# Patient Record
Sex: FEMALE | Race: WHITE | NOT HISPANIC OR LATINO | Employment: FULL TIME | URBAN - METROPOLITAN AREA
[De-identification: names, ages, dates, MRNs, and addresses within clinical notes are randomized per-mention and may not be internally consistent; named-entity substitution may affect disease eponyms.]

---

## 2018-03-07 ENCOUNTER — TRANSCRIBE ORDERS (OUTPATIENT)
Dept: ADMINISTRATIVE | Facility: HOSPITAL | Age: 43
End: 2018-03-07

## 2018-03-07 DIAGNOSIS — N63.0 MULTIPLE BENIGN LUMPS OF BREAST: Primary | ICD-10-CM

## 2018-03-12 ENCOUNTER — HOSPITAL ENCOUNTER (OUTPATIENT)
Dept: RADIOLOGY | Facility: HOSPITAL | Age: 43
Discharge: HOME | End: 2018-03-12
Attending: OBSTETRICS & GYNECOLOGY
Payer: COMMERCIAL

## 2018-03-12 ENCOUNTER — HOSPITAL ENCOUNTER (OUTPATIENT)
Dept: RADIOLOGY | Facility: HOSPITAL | Age: 43
Discharge: HOME | End: 2018-03-12
Payer: COMMERCIAL

## 2018-03-12 DIAGNOSIS — N63.0 MULTIPLE BENIGN LUMPS OF BREAST: ICD-10-CM

## 2018-03-12 PROCEDURE — 77066 DX MAMMO INCL CAD BI: CPT

## 2018-03-12 PROCEDURE — 76642 ULTRASOUND BREAST LIMITED: CPT

## 2018-03-19 ENCOUNTER — OFFICE VISIT (OUTPATIENT)
Dept: SURGERY | Facility: CLINIC | Age: 43
End: 2018-03-19
Payer: COMMERCIAL

## 2018-03-19 VITALS
BODY MASS INDEX: 26.65 KG/M2 | HEIGHT: 63 IN | HEART RATE: 80 BPM | WEIGHT: 150.4 LBS | SYSTOLIC BLOOD PRESSURE: 110 MMHG | DIASTOLIC BLOOD PRESSURE: 60 MMHG | TEMPERATURE: 98.5 F

## 2018-03-19 DIAGNOSIS — N63.10 BREAST MASS, RIGHT: Primary | ICD-10-CM

## 2018-03-19 PROCEDURE — 99204 OFFICE O/P NEW MOD 45 MIN: CPT | Performed by: SPECIALIST

## 2018-03-19 RX ORDER — MONTELUKAST SODIUM 10 MG/1
10 TABLET ORAL
COMMUNITY
End: 2021-05-07

## 2018-03-19 NOTE — PROGRESS NOTES
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   She had a mammogram and ultrasound done  This shows at the 9 o'clock position 8 cm from the nipple is a 0 5 x 0 9 x 1 0 hypoechoic oval shaped nodule which most likely represents a fibroadenoma  This is BI-RADS category three probably benign  She is not sure of any family history of breast cancer  She wishes to have this removed  It is painful to touch  Review of Systems   Respiratory: On inhalers for asthma which has gotten better since she stop smoking    Breast, right breast mass  History of masses of the right and left breast which were fibroadenoma    Historical Information   Past Medical History:   Diagnosis Date    Asthma      Past Surgical History:   Procedure Laterality Date    BREAST SURGERY       SECTION      RECTAL SURGERY       Social History   History   Alcohol Use    Yes     Comment: socially     History   Drug use: Unknown    Types: Marijuana     History   Smoking Status    Former Smoker   Smokeless Tobacco    Never Used     Family History:   Family History   Problem Relation Age of Onset    Adopted: Yes       Meds/Allergies   all current active meds have been reviewed, current meds: No current facility-administered medications for this visit       and PTA meds:    (Not in a hospital admission)  Allergies   Allergen Reactions    Penicillins Shortness Of Breath    Other      DOG    Pollen Extract        Objective       Current Vitals:   Blood Pressure: 110/60 (18 1304)  Pulse: 80 (18 1304)  Temperature: 98 5 °F (36 9 °C) (18 1304)  Temp Source: Oral (18 1304)  Height: 5' 3" (160 cm) (18 1304)  Weight - Scale: 68 2 kg (150 lb 6 4 oz) (03/19/18 2944)    Invasive Devices          No matching active lines, drains, or airways          Physical Exam   Constitutional: She is oriented to person, place, and time  She appears well-developed and well-nourished  No distress  HENT:   Head: Normocephalic and atraumatic  Right Ear: External ear normal    Left Ear: External ear normal    Nose: Nose normal    Mouth/Throat: Oropharynx is clear and moist  No oropharyngeal exudate  Eyes: Conjunctivae and EOM are normal  Pupils are equal, round, and reactive to light  Right eye exhibits no discharge  Left eye exhibits no discharge  No scleral icterus  Neck: Normal range of motion  Neck supple  No JVD present  No tracheal deviation present  No thyromegaly present  Cardiovascular: Normal rate, regular rhythm and intact distal pulses  Exam reveals no gallop and no friction rub  No murmur heard  Pulmonary/Chest: Effort normal and breath sounds normal  No stridor  No respiratory distress  She has no wheezes  She has no rales  She exhibits no tenderness  Left breast and axilla normal  Right axilla normal  Right breast at approximately nine o'clock 8 cm from the areola  Small mass present  Mobile   Abdominal: Soft  Bowel sounds are normal  She exhibits no distension and no mass  There is no tenderness  There is no rebound and no guarding  Musculoskeletal: Normal range of motion  She exhibits no edema, tenderness or deformity  Lymphadenopathy:     She has no cervical adenopathy  Neurological: She is alert and oriented to person, place, and time  She has normal reflexes  No cranial nerve deficit  She exhibits normal muscle tone  Coordination normal    Skin: Skin is warm  No rash noted  She is not diaphoretic  No erythema  No pallor  Psychiatric: She has a normal mood and affect  Her behavior is normal  Judgment and thought content normal        Lab Results: I have personally reviewed pertinent lab results    , CBC: No results found for: WBC, HGB, HCT, MCV, PLT, ADJUSTEDWBC, MCH, MCHC, RDW, MPV, NRBC, CMP: No results found for: NA, CL, CO2, ANIONGAP, BUN, CREATININE, GLUCOSE, CALCIUM, AST, ALT, ALKPHOS, PROT, ALBUMIN, BILITOT, EGFR, Coagulation: No results found for: PT, INR, APTT, Urinalysis: Lab Results   Component Value Date    COLORU taras 07/02/2016   , Amylase: No results found for: AMYLASE, Lipase: No results found for: LIPASE  Imaging: Mammo Diagnostic Bilateral W Cad    Result Date: 3/12/2018  Narrative: Patient History: Excisional biopsy of both breasts  Patient's BMI is 27 5  Reason for exam: clinical finding  Mammo Diagnostic Bilateral W CAD: March 12, 2018 - Check In #: [de-identified] Bilateral CC and MLO view(s) were taken  Technologist: JEN Sanchez The breast tissue is heterogeneously dense, potentially limiting the sensitivity of mammography  Patient risk, included in this report, assists in determining the appropriate screening regimen (such as 3-D mammography or the inclusion of automated breast ultrasound or MRI)  3-D mammography may also remain indicated as screening  There are scattered benign-appearing calcifications bilaterally  Left breast is unremarkable  There is dense tissue in the upper outer right breast with an ultrasound correlate  US Breast Right Limited: March 12, 2018 - Check In #: [de-identified] Standard views  Technologist: ERROL iLao RDMS RVT RDCS Heterogeneity can be either focal or diffuse  The breast echotexture is characterized by multiple small areas of increased and decreased echogenicity  Shadowing may occur at the interfaces of the fat lobules and parenchyma  This pattern occurs in younger breasts and those with heterogeneously dense parenchyma depicted mammographically  Real-time images are obtained in the right breast at the 9 o'clock position, 8 cm from the nipple in the region of a palpable finding    A well-circumscribed oval-shaped hypoechoic nodule measures 1 0 x 0 5 x 0 9 cm this most likely represents a fibroadenoma  IMPRESSION:Well-circumscribed hypoechoic nodule at the 9 o'clock position of the right breast corresponding to a palpable finding likely represents a fibroadenoma  ACR BI-RADS® Assessments: BiRad:3 - Probably Benign (Overall) Diag Mammogram: BiRad:3 - probably benign finding in the right breast  Right breast Right Brst US: BiRad:3 - probably benign finding in the left breast  Recommendation: Ultrasound of the right breast in 6 months  Analyzed by CAD The patient is scheduled in a reminder system for screening mammography  Transcription Location: [facility] 98: [id number] Risk Value(s): Tyrer-Cuzick 10 Year: 1 400%, Tyrer-Cuzick Lifetime: 8 700%, Myriad Table: 1 5%, EMELINA 5 Year: 1 0%, NCI Lifetime: 10 4%    Us Breast Right Limited (diagnostic)    Result Date: 3/12/2018  Narrative: Patient History: Excisional biopsy of both breasts  Patient's BMI is 27 5  Reason for exam: clinical finding  Mammo Diagnostic Bilateral W CAD: March 12, 2018 - Check In #: [de-identified] Bilateral CC and MLO view(s) were taken  Technologist: JEN Maurer The breast tissue is heterogeneously dense, potentially limiting the sensitivity of mammography  Patient risk, included in this report, assists in determining the appropriate screening regimen (such as 3-D mammography or the inclusion of automated breast ultrasound or MRI)  3-D mammography may also remain indicated as screening  There are scattered benign-appearing calcifications bilaterally  Left breast is unremarkable  There is dense tissue in the upper outer right breast with an ultrasound correlate  US Breast Right Limited: March 12, 2018 - Check In #: [de-identified] Standard views  Technologist: ERROL Jimenez RDMS RVT RDCS Heterogeneity can be either focal or diffuse  The breast echotexture is characterized by multiple small areas of increased and decreased echogenicity   Shadowing may occur at the interfaces of the fat lobules and parenchyma  This pattern occurs in younger breasts and those with heterogeneously dense parenchyma depicted mammographically  Real-time images are obtained in the right breast at the 9 o'clock position, 8 cm from the nipple in the region of a palpable finding  A well-circumscribed oval-shaped hypoechoic nodule measures 1 0 x 0 5 x 0 9 cm this most likely represents a fibroadenoma  IMPRESSION:Well-circumscribed hypoechoic nodule at the 9 o'clock position of the right breast corresponding to a palpable finding likely represents a fibroadenoma  ACR BI-RADS® Assessments: BiRad:3 - Probably Benign (Overall) Diag Mammogram: BiRad:3 - probably benign finding in the right breast  Right breast Right Brst US: BiRad:3 - probably benign finding in the left breast  Recommendation: Ultrasound of the right breast in 6 months  Analyzed by CAD The patient is scheduled in a reminder system for screening mammography  Transcription Location: [facility] 98: [id number] Risk Value(s): Tyrer-Cuzick 10 Year: 1 400%, Tyrer-Cuzick Lifetime: 8 700%, Myriad Table: 1 5%, EMELINA 5 Year: 1 0%, NCI Lifetime: 10 4%    EKG, Pathology, and Other Studies: I have personally reviewed pertinent reports  Assessment/Plan     Assessment:  José Miguel has a right breast mass  I believe I am feeling it but would like to check her on a different part of her cycle to make sure that this is the area that needs to be excised  Plan:  Re examined in two weeks at different part of cycle        Counseling / Coordination of Care  Total face to face office time spent today 45 minutes  Greater than 50% of total time was spent with the patient and / or family counseling and / or coordination of care  A description of the counseling / coordination of care- see assessement

## 2018-03-19 NOTE — PROGRESS NOTES
Assessment/Plan:      Diagnoses and all orders for this visit:    Breast mass, right    Other orders  -     montelukast (SINGULAIR) 10 mg tablet; Take 10 mg by mouth daily at bedtime          Subjective:     Patient ID: Kathleen Landaverde is a 36 y o  female      HPI    Review of Systems      Objective:     Physical Exam

## 2018-04-02 ENCOUNTER — OFFICE VISIT (OUTPATIENT)
Dept: SURGERY | Facility: CLINIC | Age: 43
End: 2018-04-02
Payer: COMMERCIAL

## 2018-04-02 VITALS
HEART RATE: 57 BPM | DIASTOLIC BLOOD PRESSURE: 78 MMHG | TEMPERATURE: 98.2 F | WEIGHT: 150.6 LBS | BODY MASS INDEX: 26.68 KG/M2 | SYSTOLIC BLOOD PRESSURE: 118 MMHG | HEIGHT: 63 IN

## 2018-04-02 DIAGNOSIS — N63.10 BREAST MASS, RIGHT: Primary | ICD-10-CM

## 2018-04-02 PROCEDURE — 99213 OFFICE O/P EST LOW 20 MIN: CPT | Performed by: SPECIALIST

## 2018-04-02 NOTE — PROGRESS NOTES
Assessment/Plan:  Andrew Corral returns today for evaluation of a mass of the right breast found on the ultrasound  She is still not sure of the area today as she is just now finishing her menstrual cycle  We will check again in two weeks at her mid cycle  It we can feel this mesh she would like to have this removed  She will call sooner if she feels it before that time  Diagnoses and all orders for this visit:    Breast mass, right          Subjective:     Patient ID: Gerardo Whitley is a 36 y o  female  Bruno Garner comes back today to check a right breast mass  She had felt a mass and an ultrasound showed to be a fibroadenoma  She did not have a biopsy  When I checked her two weeks ago she had trouble feeling the area  She is now having her menstrual cycle which is finished and her breasts are somewhat full  She is having trouble feeling it today  She does have an area of tenderness  This measured 1 x 0 5 x 0 9 cm most likely representing a fibroadenoma  She had a mammogram and an ultrasound  Review of Systems  history of three fibroadenomas removed in the past since 1995    Objective:     Physical Exam   Pulmonary/Chest:   Nodule present the 9 o'clock position 8 cm from the areola    Some tenderness

## 2018-12-18 ENCOUNTER — TRANSCRIBE ORDERS (OUTPATIENT)
Dept: ADMINISTRATIVE | Facility: HOSPITAL | Age: 43
End: 2018-12-18

## 2018-12-18 DIAGNOSIS — N63.10 BREAST MASS, RIGHT: Primary | ICD-10-CM

## 2019-01-04 ENCOUNTER — HOSPITAL ENCOUNTER (OUTPATIENT)
Dept: RADIOLOGY | Facility: HOSPITAL | Age: 44
Discharge: HOME | End: 2019-01-04
Attending: OBSTETRICS & GYNECOLOGY
Payer: COMMERCIAL

## 2019-01-04 VITALS — WEIGHT: 143 LBS | HEIGHT: 63 IN | BODY MASS INDEX: 25.34 KG/M2

## 2019-01-04 DIAGNOSIS — R92.8 ABNORMAL MAMMOGRAM: ICD-10-CM

## 2019-01-04 PROCEDURE — 76642 ULTRASOUND BREAST LIMITED: CPT

## 2019-01-04 PROCEDURE — G0279 TOMOSYNTHESIS, MAMMO: HCPCS

## 2019-01-04 PROCEDURE — 77066 DX MAMMO INCL CAD BI: CPT

## 2019-06-11 ENCOUNTER — TRANSCRIBE ORDERS (OUTPATIENT)
Dept: ADMINISTRATIVE | Facility: HOSPITAL | Age: 44
End: 2019-06-11

## 2019-06-11 DIAGNOSIS — N63.10 BREAST MASS, RIGHT: Primary | ICD-10-CM

## 2019-06-12 ENCOUNTER — TRANSCRIBE ORDERS (OUTPATIENT)
Dept: ADMINISTRATIVE | Facility: HOSPITAL | Age: 44
End: 2019-06-12

## 2019-06-12 DIAGNOSIS — D24.1 FIBROADENOMA OF RIGHT BREAST: Primary | ICD-10-CM

## 2019-06-26 ENCOUNTER — HOSPITAL ENCOUNTER (OUTPATIENT)
Dept: RADIOLOGY | Facility: HOSPITAL | Age: 44
Discharge: HOME | End: 2019-06-26
Attending: OBSTETRICS & GYNECOLOGY
Payer: COMMERCIAL

## 2019-06-26 ENCOUNTER — HOSPITAL ENCOUNTER (OUTPATIENT)
Dept: RADIOLOGY | Facility: HOSPITAL | Age: 44
Discharge: HOME | End: 2019-06-26
Payer: COMMERCIAL

## 2019-06-26 VITALS — HEIGHT: 63 IN | BODY MASS INDEX: 25.33 KG/M2

## 2019-06-26 DIAGNOSIS — D24.1 FIBROADENOMA OF RIGHT BREAST: ICD-10-CM

## 2019-06-26 DIAGNOSIS — N63.10 BREAST MASS, RIGHT: ICD-10-CM

## 2019-06-26 PROCEDURE — 77065 DX MAMMO INCL CAD UNI: CPT

## 2019-06-26 PROCEDURE — G0279 TOMOSYNTHESIS, MAMMO: HCPCS

## 2020-01-22 ENCOUNTER — OFFICE VISIT (OUTPATIENT)
Dept: URGENT CARE | Facility: CLINIC | Age: 45
End: 2020-01-22
Payer: COMMERCIAL

## 2020-01-22 VITALS
SYSTOLIC BLOOD PRESSURE: 139 MMHG | HEIGHT: 63 IN | RESPIRATION RATE: 18 BRPM | TEMPERATURE: 98.9 F | DIASTOLIC BLOOD PRESSURE: 82 MMHG | OXYGEN SATURATION: 100 % | BODY MASS INDEX: 25.69 KG/M2 | HEART RATE: 62 BPM | WEIGHT: 145 LBS

## 2020-01-22 DIAGNOSIS — M77.8 TENDINITIS OF RIGHT FOREARM: ICD-10-CM

## 2020-01-22 DIAGNOSIS — M77.11 RIGHT LATERAL EPICONDYLITIS: Primary | ICD-10-CM

## 2020-01-22 PROCEDURE — 99213 OFFICE O/P EST LOW 20 MIN: CPT | Performed by: PHYSICIAN ASSISTANT

## 2020-01-22 RX ORDER — MELOXICAM 15 MG/1
15 TABLET ORAL DAILY
Qty: 15 TABLET | Refills: 0 | Status: SHIPPED | OUTPATIENT
Start: 2020-01-22 | End: 2021-05-05 | Stop reason: ALTCHOICE

## 2020-01-23 NOTE — PROGRESS NOTES
[facility]        NAME: Keena Muñiz is a 38 y o  female  : 1975    MRN: [id number]  DATE: 2020  TIME: 5:50 PM    Assessment and Plan   Right lateral epicondylitis [M77 11]  1  Right lateral epicondylitis  Ambulatory referral to Orthopedic Surgery    Ambulatory referral to Physical Therapy    meloxicam (MOBIC) 15 mg tablet   2  Tendinitis of right forearm  Ambulatory referral to Orthopedic Surgery    Ambulatory referral to Physical Therapy    meloxicam (MOBIC) 15 mg tablet         Patient Instructions     Discussed condition with pt  She has tennis elbow on the right and resultant tendinitis of the forearm due to compensation  I gave her Rx for Meloxicam and rec ice, gentle stretching, avoiding overuse as much as possible  I also gave her order to start PT and for ortho consult if condition persists/worsens despite conservative measures  Follow up with PCP in 3-5 days  Proceed to  ER if symptoms worsen  Chief Complaint     Chief Complaint   Patient presents with    Arm Pain     pain in rt arm from above the elbow to wrist  Has job with repititive hand movements  has been bothersome for 4 months         History of Present Illness       Pt presents with 3-4 month history of persistent pain in her right lateral elbow into the forearm and down to the wrist  Denies known injury/trauma  She reports she performs a lot of daily repetitive tasks and is right hand dominant  Denies any RUE numbness or paresthesias  Pain does not radiate proximally into the upper arm or shoulder  Has tried managing conservatively including wearing a tennis elbow sleeve without relief  Review of Systems   Review of Systems   Constitutional: Negative  Respiratory: Negative  Cardiovascular: Negative  Gastrointestinal: Negative  Genitourinary: Negative  Musculoskeletal:        Right elbow and forearm pain   Neurological: Negative            Current Medications       Current Outpatient Medications:     albuterol (PROVENTIL HFA,VENTOLIN HFA) 90 mcg/act inhaler, Inhale 2 puffs every 6 (six) hours as needed for wheezing , Disp: , Rfl:     fluticasone-salmeterol (ADVAIR) 250-50 mcg/dose inhaler, Inhale 1 puff daily  , Disp: , Rfl:     montelukast (SINGULAIR) 10 mg tablet, Take 10 mg by mouth daily at bedtime, Disp: , Rfl:     meloxicam (MOBIC) 15 mg tablet, Take 1 tablet (15 mg total) by mouth daily, Disp: 15 tablet, Rfl: 0    Current Allergies     Allergies as of 2020 - Reviewed 2020   Allergen Reaction Noted    Penicillins Shortness Of Breath 2016    Other  2017    Pollen extract  2017            The following portions of the patient's history were reviewed and updated as appropriate: allergies, current medications, past family history, past medical history, past social history, past surgical history and problem list      Past Medical History:   Diagnosis Date    Asthma        Past Surgical History:   Procedure Laterality Date    BREAST BIOPSY Bilateral     benign    BREAST CYST EXCISION Right 2019    benign    BREAST SURGERY       SECTION      RECTAL SURGERY         Family History   Adopted: Yes         Medications have been verified  Objective   /82   Pulse 62   Temp 98 9 °F (37 2 °C)   Resp 18   Ht 5' 3" (1 6 m)   Wt 65 8 kg (145 lb)   LMP 2019   SpO2 100%   BMI 25 69 kg/m²        Physical Exam     Physical Exam   Constitutional: She is oriented to person, place, and time  She appears well-developed and well-nourished  No distress  Musculoskeletal:   TTP over the right lateral epicondyle worsened with active wrist extension against resistance  Also tender diffusely over the dorsal right forearm but no visible STS or other deformity noted  Wrist exam is unremarkable  Neurological: She is alert and oriented to person, place, and time  Psychiatric: She has a normal mood and affect     Vitals reviewed

## 2020-01-23 NOTE — PATIENT INSTRUCTIONS
Tennis Elbow   WHAT YOU NEED TO KNOW:   Tennis elbow is inflammation of the tendons in your elbow  Tendons are strong tissues that connect muscle to bone  DISCHARGE INSTRUCTIONS:   Return to the emergency department if:   · You suddenly have no feeling in your arm, hand, or fingers  · You suddenly cannot move your arm, wrist, hand, or fingers  Contact your healthcare provider if:   · You have a fever  · You have more pain or weakness in your arm, wrist, hand, or fingers  · You have new numbness or tingling in your arm, hand, or fingers  · You have questions or concerns about your condition or care  Medicines:   · Acetaminophen  decreases pain and fever  It is available without a doctor's order  Ask how much to take and how often to take it  Follow directions  Read the labels of all other medicines you are using to see if they also contain acetaminophen, or ask your doctor or pharmacist  Acetaminophen can cause liver damage if not taken correctly  Do not use more than 4 grams (4,000 milligrams) total of acetaminophen in one day  · NSAIDs , such as ibuprofen, help decrease swelling, pain, and fever  This medicine is available with or without a doctor's order  NSAIDs can cause stomach bleeding or kidney problems in certain people  If you take blood thinner medicine, always ask your healthcare provider if NSAIDs are safe for you  Always read the medicine label and follow directions  · Take your medicine as directed  Contact your healthcare provider if you think your medicine is not helping or if you have side effects  Tell him or her if you are allergic to any medicine  Keep a list of the medicines, vitamins, and herbs you take  Include the amounts, and when and why you take them  Bring the list or the pill bottles to follow-up visits  Carry your medicine list with you in case of an emergency    Physical therapy:  A physical therapist teaches you exercises to help improve movement and strength, and to decrease pain  Self-care:   · Wear your support device as directed  Devices, such as an arm strap, brace, or splint, help limit your arm movement  They also decrease pain and help prevent more damage to your tendon  Ask your healthcare provider how to care for your arm while you wear a brace or splint  · Rest your injured arm  and avoid activities that cause pain  This will help your tendons heal     · Apply ice on your elbow  for 15 to 20 minutes every hour or as directed  Use an ice pack, or put crushed ice in a plastic bag  Cover it with a towel before you apply it to your skin  Ice helps prevent tissue damage and decreases swelling and pain  · Elevate your elbow  above the level of your heart as often as you can  This will help decrease swelling and pain  Prop your elbow on pillows or blankets to keep it elevated comfortably  Follow up with your healthcare provider as directed:  Write down your questions so you remember to ask them during your visits  © 2017 [address] [facility] Information is for End User's use only and may not be sold, redistributed or otherwise used for commercial purposes  All illustrations and images included in CareNotes® are the copyrighted property of A D [employer] M [employer]  or [employer]  The above information is an  only  It is not intended as medical advice for individual conditions or treatments  Talk to your doctor, nurse or pharmacist before following any medical regimen to see if it is safe and effective for you  Tendinitis   WHAT YOU NEED TO KNOW:   Tendinitis is painful inflammation or breakdown of your tendons  It may also be called tendinopathy  Tendinitis often occurs in the knee, shoulder, ankle, hip, or elbow  DISCHARGE INSTRUCTIONS:   Medicines:   · Pain medicines  such as acetaminophen or NSAIDs may decrease swelling and pain or fever  These medicines are available without a doctor's order  Ask which medicine to take   Ask how much to take and when to take it  Follow directions  Acetaminophen and NSAIDs can cause liver or kidney damage if not taken correctly  If you take blood thinner medicine, always ask your healthcare provider if NSAIDs are safe for you  Always read the medicine label and follow the directions on it before using these medicine  · Take your medicine as directed  Contact your healthcare provider if you think your medicine is not helping or if you have side effects  Tell him if you are allergic to any medicine  Keep a list of the medicines, vitamins, and herbs you take  Include the amounts, and when and why you take them  Bring the list or the pill bottles to follow-up visits  Carry your medicine list with you in case of an emergency  Management:   · Rest  your tendon as directed to help it heal  Ask your healthcare provider if you need to stop putting weight on your affected area  · Ice  helps decrease swelling and pain  Ice may also help prevent tissue damage  Use an ice pack, or put crushed ice in a plastic bag  Cover it with a towel and place it on the affected area for 10 to 15 minutes every hour or as directed  · Support devices  such as a cane, splint, shoe insert, or brace may help reduce your pain  · Physical therapy  may be ordered by your healthcare provider  This may be used to teach you exercises to help improve movement and strength, and to decrease pain  You may also learn how to improve your posture, and how to lift or exercise correctly  Prevention:   · Stretch and warm up  before you exercise  · Exercise regularly  to strengthen the muscles around your joint  Ease into an exercise routine for the first 3 weeks to prevent another injury  Ask your healthcare provider about the best exercise plan for you  Rest fully between activities  · Use the right equipment  for sports and exercise  Wear braces or tape around weak joints as directed    Follow up with your healthcare provider as directed:  Write down your questions so you remember to ask them during your visits  Contact your healthcare provider if:   · You have increased pain even after you take medicine  · You have questions or concerns about your condition or care  Return to the emergency department if:   · You have increased redness over the joint, or swelling in the joint  · You suddenly cannot move your joint  © 2017 [address] Information is for End User's use only and may not be sold, redistributed or otherwise used for commercial purposes  All illustrations and images included in CareNotes® are the copyrighted property of [employer]  or [employer]  The above information is an  only  It is not intended as medical advice for individual conditions or treatments  Talk to your doctor, nurse or pharmacist before following any medical regimen to see if it is safe and effective for you

## 2021-01-28 ENCOUNTER — OFFICE VISIT (OUTPATIENT)
Dept: FAMILY MEDICINE CLINIC | Facility: CLINIC | Age: 46
End: 2021-01-28
Payer: COMMERCIAL

## 2021-01-28 VITALS
WEIGHT: 155 LBS | HEART RATE: 76 BPM | BODY MASS INDEX: 27.46 KG/M2 | HEIGHT: 63 IN | TEMPERATURE: 96.4 F | RESPIRATION RATE: 16 BRPM | DIASTOLIC BLOOD PRESSURE: 70 MMHG | SYSTOLIC BLOOD PRESSURE: 104 MMHG

## 2021-01-28 DIAGNOSIS — Z13.6 SCREENING FOR CARDIOVASCULAR, RESPIRATORY, AND GENITOURINARY DISEASES: ICD-10-CM

## 2021-01-28 DIAGNOSIS — J45.40 MODERATE PERSISTENT ASTHMA WITHOUT COMPLICATION: ICD-10-CM

## 2021-01-28 DIAGNOSIS — R53.83 OTHER FATIGUE: ICD-10-CM

## 2021-01-28 DIAGNOSIS — Z13.83 SCREENING FOR CARDIOVASCULAR, RESPIRATORY, AND GENITOURINARY DISEASES: ICD-10-CM

## 2021-01-28 DIAGNOSIS — M25.512 PAIN OF BOTH SHOULDER JOINTS: ICD-10-CM

## 2021-01-28 DIAGNOSIS — Z13.1 SCREENING FOR DIABETES MELLITUS (DM): ICD-10-CM

## 2021-01-28 DIAGNOSIS — M25.511 PAIN OF BOTH SHOULDER JOINTS: ICD-10-CM

## 2021-01-28 DIAGNOSIS — M79.10 MYALGIA: ICD-10-CM

## 2021-01-28 DIAGNOSIS — J30.9 ALLERGIC RHINITIS, UNSPECIFIED SEASONALITY, UNSPECIFIED TRIGGER: Primary | ICD-10-CM

## 2021-01-28 DIAGNOSIS — Z13.89 SCREENING FOR CARDIOVASCULAR, RESPIRATORY, AND GENITOURINARY DISEASES: ICD-10-CM

## 2021-01-28 DIAGNOSIS — Z00.00 HEALTHCARE MAINTENANCE: ICD-10-CM

## 2021-01-28 PROCEDURE — 99204 OFFICE O/P NEW MOD 45 MIN: CPT | Performed by: FAMILY MEDICINE

## 2021-01-28 PROCEDURE — 3725F SCREEN DEPRESSION PERFORMED: CPT | Performed by: FAMILY MEDICINE

## 2021-01-28 PROCEDURE — 1036F TOBACCO NON-USER: CPT | Performed by: FAMILY MEDICINE

## 2021-01-28 PROCEDURE — 3008F BODY MASS INDEX DOCD: CPT | Performed by: FAMILY MEDICINE

## 2021-01-28 RX ORDER — MONTELUKAST SODIUM 10 MG/1
10 TABLET ORAL DAILY
Qty: 30 TABLET | Refills: 5 | Status: SHIPPED | OUTPATIENT
Start: 2021-01-28 | End: 2021-08-02

## 2021-01-28 RX ORDER — BIOTIN 10000 MCG
CAPSULE ORAL
COMMUNITY

## 2021-01-28 RX ORDER — FLUTICASONE PROPIONATE 250 UG/1
1 POWDER, METERED RESPIRATORY (INHALATION) 2 TIMES DAILY
Qty: 1 EACH | Refills: 5 | Status: SHIPPED | OUTPATIENT
Start: 2021-01-28 | End: 2021-05-03

## 2021-01-28 RX ORDER — MONTELUKAST SODIUM 10 MG/1
10 TABLET ORAL DAILY
COMMUNITY
Start: 2021-01-02 | End: 2021-01-28 | Stop reason: SDUPTHER

## 2021-01-28 RX ORDER — FLUTICASONE PROPIONATE 250 UG/1
1 POWDER, METERED RESPIRATORY (INHALATION) 2 TIMES DAILY
COMMUNITY
Start: 2021-01-23 | End: 2021-01-28 | Stop reason: SDUPTHER

## 2021-01-28 RX ORDER — DIPHENOXYLATE HYDROCHLORIDE AND ATROPINE SULFATE 2.5; .025 MG/1; MG/1
1 TABLET ORAL DAILY
COMMUNITY

## 2021-01-28 RX ORDER — ALBUTEROL SULFATE 90 UG/1
2 AEROSOL, METERED RESPIRATORY (INHALATION) EVERY 6 HOURS PRN
Qty: 1 INHALER | Refills: 5 | Status: SHIPPED | OUTPATIENT
Start: 2021-01-28 | End: 2021-05-03 | Stop reason: SDUPTHER

## 2021-01-28 RX ORDER — ALBUTEROL SULFATE 90 UG/1
AEROSOL, METERED RESPIRATORY (INHALATION)
COMMUNITY
Start: 2021-01-05 | End: 2021-01-28 | Stop reason: SDUPTHER

## 2021-01-28 NOTE — PROGRESS NOTES
Assessment/Plan:    No problem-specific Assessment & Plan notes found for this encounter  Asthma, persistent type  Unchanged  Suggest she not use MARTINE unless needed in order to gauge response to prevention  Pulmonary referral given at pt request    AR  Stable  Lives with triggers in home  h1b use discussed  Refill singulair    Fatigue/myalgias/malaise  Start with labs r/o arthritides and autoimmune    Does have psych hx but denies issues at this time     Diagnoses and all orders for this visit:    Allergic rhinitis, unspecified seasonality, unspecified trigger    Pain of both shoulder joints    Moderate persistent asthma without complication  -     Flovent Diskus 250 MCG/BLIST AEPB; Inhale 1 puff 2 (two) times a day Rinse/spit after use  -     albuterol (PROVENTIL HFA,VENTOLIN HFA) 90 mcg/act inhaler; Inhale 2 puffs every 6 (six) hours as needed for shortness of breath Rinse/spit after use  -     montelukast (SINGULAIR) 10 mg tablet; Take 1 tablet (10 mg total) by mouth daily  -     Ambulatory referral to Pulmonology; Future    Screening for cardiovascular, respiratory, and genitourinary diseases  -     Lipid Panel with Direct LDL reflex; Future    Screening for diabetes mellitus (DM)  -     Comprehensive metabolic panel; Future    Healthcare maintenance  -     Ambulatory referral to Obstetrics / Gynecology; Future  -     Ambulatory referral to Obstetrics / Gynecology    Other fatigue  -     TSH, 3rd generation; Future  -     Lyme Total Antibody Profile with reflex to WB; Future  -     CBC and differential; Future    Myalgia  -     ALEM Screen w/ Reflex to Titer/Pattern; Future  -     C-reactive protein; Future  -     Sedimentation rate, automated; Future  -     RF Screen w/ Reflex to Titer; Future  -     Vitamin D 25 hydroxy;  Future    Other orders  -     Discontinue: albuterol (PROVENTIL HFA,VENTOLIN HFA) 90 mcg/act inhaler; INHALE 2 PUFFS BY MOUTH EVERY 6 HOURS AS NEEED FOR WHEEZING  -     Discontinue: Flovent Diskus 250 MCG/BLIST AEPB; Inhale 1 puff 2 (two) times a day  -     Discontinue: montelukast (SINGULAIR) 10 mg tablet; Take 10 mg by mouth daily  -     multivitamin (THERAGRAN) TABS; Take 1 tablet by mouth daily  -     Biotin 10 MG CAPS; Take by mouth  -     Probiotic Product (PROBIOTIC-10 PO); Take by mouth              Return in about 2 weeks (around 2/11/2021) for Annual physical     Subjective:      Patient ID: Edgar Ordonez is a 54 y o  female  Chief Complaint   Patient presents with   [facility]     new patient  ac/cma     joint pain     per pt whole body  has had this for years  HPI  Prior doctor Dr Brent Sy    Has asthma, since age 24s  On flovent disk 250 bid  singulair helps    Albuterol mdi also  Prn  Weather trigger  Also illness trigger  Uses albuterol 1-2x/d regularly as told, uses prn about 6-7x/d when bad    had allergist bar with skin tests in past  h1b in past prn  Allergic to own rabbit and own dog    Has years of body pain per pt  Sensitive to touch  Arms/fingers/legs and now joints  Physical job  Daily  Getting worse  No prior testing  Adopted  Feels fingers swell at times  No joint clicking  Wt fluctuates  No unexplained wt loss  No prior lyme test or tick bite  Used to spend time outdoors    No psoriasis    Naproxen has helped in past    Last gyn few years ago  Dr Aram Arita  Due for mammo q6m    Hx of psychiatrist  Pills in past  Didn't like so stopped  Denies depression today or anxiety    The following portions of the patient's history were reviewed and updated as appropriate: allergies, current medications, past family history, past medical history, past social history, past surgical history and problem list     Review of Systems   Constitutional: Positive for fatigue  Negative for chills and fever  HENT: Negative for trouble swallowing  Eyes: Negative for discharge and redness  Respiratory: Negative for shortness of breath      Cardiovascular: Negative for chest pain    Musculoskeletal: Positive for arthralgias, joint swelling and myalgias  Negative for neck stiffness  Skin: Negative for color change and rash  Psychiatric/Behavioral: Negative for dysphoric mood  The patient is not nervous/anxious  Current Outpatient Medications   Medication Sig Dispense Refill    albuterol (PROVENTIL HFA,VENTOLIN HFA) 90 mcg/act inhaler Inhale 2 puffs every 6 (six) hours as needed for shortness of breath Rinse/spit after use 1 Inhaler 5    Biotin 10 MG CAPS Take by mouth      Flovent Diskus 250 MCG/BLIST AEPB Inhale 1 puff 2 (two) times a day Rinse/spit after use 1 each 5    montelukast (SINGULAIR) 10 mg tablet Take 1 tablet (10 mg total) by mouth daily 30 tablet 5    multivitamin (THERAGRAN) TABS Take 1 tablet by mouth daily      Probiotic Product (PROBIOTIC-10 PO) Take by mouth       No current facility-administered medications for this visit  Objective:    /70   Pulse 76   Temp (!) 96 4 °F (35 8 °C)   Resp 16   Ht 5' 3" (1 6 m)   Wt 70 3 kg (155 lb)   BMI 27 46 kg/m²        Physical Exam  Vitals signs and nursing note reviewed  Constitutional:       General: She is not in acute distress  Appearance: Normal appearance  She is well-developed  She is not ill-appearing  HENT:      Head: Normocephalic  Right Ear: Tympanic membrane, ear canal and external ear normal  There is no impacted cerumen  Left Ear: Tympanic membrane, ear canal and external ear normal  There is no impacted cerumen  Mouth/Throat:      Mouth: Mucous membranes are moist       Pharynx: No oropharyngeal exudate or posterior oropharyngeal erythema  Eyes:      General: No scleral icterus  Conjunctiva/sclera: Conjunctivae normal    Neck:      Musculoskeletal: Neck supple  No neck rigidity  Thyroid: No thyromegaly  Cardiovascular:      Rate and Rhythm: Normal rate and regular rhythm  Pulses: Normal pulses  Heart sounds: No murmur     Pulmonary: Effort: Pulmonary effort is normal  No respiratory distress  Breath sounds: No wheezing or rales  Abdominal:      General: There is no distension  Palpations: Abdomen is soft  Tenderness: There is no abdominal tenderness  There is no rebound  Musculoskeletal:         General: No swelling or deformity  Right lower leg: No edema  Left lower leg: No edema  Comments: No redness or synovitis   Lymphadenopathy:      Cervical: No cervical adenopathy  Skin:     General: Skin is warm and dry  Coloration: Skin is not jaundiced or pale  Findings: No rash  Comments: No psoriatic plaques   Neurological:      Mental Status: She is alert  Motor: No weakness  Gait: Gait normal    Psychiatric:         Mood and Affect: Mood normal          Behavior: Behavior normal          Thought Content: Thought content normal        BMI Counseling: Body mass index is 27 46 kg/m²  The BMI is above normal  Nutrition recommendations include decreasing portion sizes and moderation in carbohydrate intake  Exercise recommendations include exercising 3-5 times per week  No pharmacotherapy was ordered                  Wilhemena Koyanagi, DO

## 2021-04-15 ENCOUNTER — OFFICE VISIT (OUTPATIENT)
Dept: FAMILY MEDICINE CLINIC | Facility: CLINIC | Age: 46
End: 2021-04-15
Payer: COMMERCIAL

## 2021-04-15 VITALS
OXYGEN SATURATION: 98 % | HEART RATE: 75 BPM | WEIGHT: 162.4 LBS | HEIGHT: 63 IN | RESPIRATION RATE: 18 BRPM | BODY MASS INDEX: 28.77 KG/M2 | DIASTOLIC BLOOD PRESSURE: 86 MMHG | TEMPERATURE: 97.9 F | SYSTOLIC BLOOD PRESSURE: 122 MMHG

## 2021-04-15 DIAGNOSIS — F41.9 ANXIETY: Primary | ICD-10-CM

## 2021-04-15 DIAGNOSIS — J30.9 ALLERGIC RHINITIS, UNSPECIFIED SEASONALITY, UNSPECIFIED TRIGGER: ICD-10-CM

## 2021-04-15 PROCEDURE — 1036F TOBACCO NON-USER: CPT | Performed by: NURSE PRACTITIONER

## 2021-04-15 PROCEDURE — 3725F SCREEN DEPRESSION PERFORMED: CPT | Performed by: NURSE PRACTITIONER

## 2021-04-15 PROCEDURE — 99213 OFFICE O/P EST LOW 20 MIN: CPT | Performed by: NURSE PRACTITIONER

## 2021-04-15 NOTE — PATIENT INSTRUCTIONS
Anxiety   AMBULATORY CARE:   Anxiety  is a condition that causes you to feel extremely worried or nervous  The feelings are so strong that they can cause problems with your daily activities or sleep  Anxiety may be triggered by something you fear, or it may happen without a cause  Family or work stress, smoking, caffeine, and alcohol can increase your risk for anxiety  Certain medicines or health conditions can also increase your risk  Anxiety can become a long-term condition if it is not managed or treated  Common signs and symptoms that may occur with anxiety:   · Fatigue or muscle tightness    · Shaking, restlessness, or irritability    · Problems focusing    · Trouble sleeping    · Feeling jumpy, easily startled, or dizzy    · Rapid heartbeat or shortness of breath    Call your local emergency number (911 in the [address]) if:   · You have chest pain, tightness, or heaviness that may spread to your shoulders, arms, jaw, neck, or back  · You feel like hurting yourself or someone else  Call your doctor if:   · Your symptoms get worse or do not get better with treatment  · Your anxiety keeps you from doing your regular daily activities  · You have new symptoms since your last visit  · You have questions or concerns about your condition or care  Treatment for anxiety  may include medicines to help you feel calm and relaxed, and decrease your symptoms  Medicines are usually given together with therapy or other treatments  Manage anxiety:   · Talk to someone about your anxiety  Your healthcare provider may suggest counseling  Cognitive behavioral therapy can help you understand and change how you react to events that trigger your symptoms  You might feel more comfortable talking with a friend or family member about your anxiety  Choose someone you know will be supportive and encouraging  · Find ways to relax  Activities such as exercise, meditation, or listening to music can help you relax   Spend time with friends, or do things you enjoy  · Practice deep breathing  Deep breathing can help you relax when you feel anxious  Focus on taking slow, deep breaths several times a day, or during an anxiety attack  Breathe in through your nose and out through your mouth  · Create a regular sleep routine  Regular sleep can help you feel calmer during the day  Go to sleep and wake up at the same times every day  Do not watch television or use the computer right before bed  Your room should be comfortable, dark, and quiet  · Eat a variety of healthy foods  Healthy foods include fruits, vegetables, low-fat dairy products, lean meats, fish, whole-grain breads, and cooked beans  Healthy foods can help you feel less anxious and have more energy  · Exercise regularly  Exercise can increase your energy level  Exercise may also lift your mood and help you sleep better  Your healthcare provider can help you create an exercise plan  · Do not smoke  Nicotine and other chemicals in cigarettes and cigars can increase anxiety  Ask your healthcare provider for information if you currently smoke and need help to quit  E-cigarettes or smokeless tobacco still contain nicotine  Talk to your healthcare provider before you use these products  · Do not have caffeine  Caffeine can make your symptoms worse  Do not have foods or drinks that are meant to increase your energy level  · Limit or do not drink alcohol  Ask your healthcare provider if alcohol is safe for you  You may not be able to drink alcohol if you take certain anxiety or depression medicines  Limit alcohol to 1 drink per day if you are a woman  Limit alcohol to 2 drinks per day if you are a man  A drink of alcohol is 12 ounces of beer, 5 ounces of wine, or 1½ ounces of liquor  · Do not use drugs  Drugs can make your anxiety worse  It can also make anxiety hard to manage   Talk to your healthcare provider if you use drugs and want help to quit     Follow up with your doctor within 2 weeks or as directed:  Write down your questions so you remember to ask them during your visits  © Copyright [address] Information is for End User's use only and may not be sold, redistributed or otherwise used for commercial purposes  All illustrations and images included in CareNotes® are the copyrighted property of [employer] Opara Carmita  or [facility] Yvonne Hobson   The above information is an  only  It is not intended as medical advice for individual conditions or treatments  Talk to your doctor, nurse or pharmacist before following any medical regimen to see if it is safe and effective for you

## 2021-04-15 NOTE — PROGRESS NOTES
Assessment/Plan:  Vitals stable and heart rate is regular and controlled and offered to do EKG in office  Discussed with patient that her symptoms are highly suspicious of panic attacks but refusing any treatment at this time  Will continue to monitor and will follow back for any concerns  Advised to go to ER for any chest pain and palpitations  Will do blood work soon and will follow back in office for physical   Advised on diversion al therapies and deep breathing exercises  1  Anxiety  Comments:  supportive care advised and will follow back as needed    2  Allergic rhinitis, unspecified seasonality, unspecified trigger  Comments:  stable with current regimen            Patient Instructions:  Supportive care discussed and advised  Advised to RTO for any worsening and no improvement  Follow up for no improvement and worsening of conditions  Patient advised and educated when to see immediate medical care  Return if symptoms worsen or fail to improve  Future Appointments   Date Time Provider Zenaida Cartwright   5/3/2021  8:30 AM DO CHRISSY Navarrete [facility]   5/5/2021 11:00 AM Butch Laws MD [facility]           Subjective:      Patient ID: Soco Peralta is a 54 y o  female  Chief Complaint   Patient presents with    Anxiety     anxiety attacks  jma         Vitals:  /86   Pulse 75   Temp 97 9 °F (36 6 °C)   Resp 18   Ht 5' 3" (1 6 m)   Wt 73 7 kg (162 lb 6 4 oz)   LMP 03/12/2021 (Exact Date)   SpO2 98%   BMI 28 77 kg/m²     HPI  Patient stated that yesterday was working and suddenly felt her heart racing and sweating but denies any sob  Stated that then later slept for about an hour and half and felt better  Stated that feeling better today  Stated that has h/o anxiety started about 5 years ago and was on medication which she stopped after 8 months as did not feel good on them  Stated that not sure if had the panic attack yesterday or not   Stated that was stressed about 5 years ago when she was having anxiety but this time not stressed  Denies any family h/o premature CAD  Blood work ordered by Dr Kelley Fernandez in January but have not done it and advised to do it soon            PHQ-9 Depression Screening    PHQ-9:   Frequency of the following problems over the past two weeks:      Little interest or pleasure in doing things: 0 - not at all  Feeling down, depressed, or hopeless: 3 - nearly every day  Trouble falling or staying asleep, or sleeping too much: 1 - several days  Feeling tired or having little energy: 0 - not at all  Poor appetite or overeatin - several days  Feeling bad about yourself - or that you are a failure or have let yourself or your family down: 3 - nearly every day  Trouble concentrating on things, such as reading the newspaper or watching television: 0 - not at all  Moving or speaking so slowly that other people could have noticed  Or the opposite - being so fidgety or restless that you have been moving around a lot more than usual: 0 - not at all  Thoughts that you would be better off dead, or of hurting yourself in some way: 0 - not at all  PHQ-2 Score: 3  PHQ-9 Score: 8       MADISON-7 Flowsheet Screening      Most Recent Value   Over the last two weeks, how often have you been bothered by the following problems? Feeling nervous, anxious, or on edge  3   Not being able to stop or control worrying  3   Worrying too much about different things  3   Trouble relaxing   3   Being so restless that it's hard to sit still  3   Becoming easily annoyed or irritable   3   Feeling afraid as if something awful might happen  3   How difficult have these problems made it for you to do your work, take care of things at home, or get along with other people? Not difficult at all   MADISON Score   21          Depression Screening Follow-up Plan: Patient's depression screening was positive with a PHQ-2 score of 3  Their PHQ-9 score was 8   Patient declines further evaluation by mental health professional and/or medications  They have no active suicidal ideations  Brief counseling provided and recommend additional follow-up/re-evaluation at next office visit  The following portions of the patient's history were reviewed and updated as appropriate: allergies, current medications, past family history, past medical history, past social history, past surgical history and problem list       Review of Systems   Constitutional: Positive for diaphoresis  HENT: Negative  Respiratory: Negative  Cardiovascular: Negative for chest pain and leg swelling  As noted in HPI     Gastrointestinal: Negative  Genitourinary: Negative  Musculoskeletal: Negative  Neurological: Negative  Psychiatric/Behavioral:        As noted in HPI           Objective:    Social History     Tobacco Use   Smoking Status Former Smoker    Start date: 1/28/2011   Smokeless Tobacco Never Used       Allergies: Allergies   Allergen Reactions    Penicillins Shortness Of Breath    Penicillins Hives and Shortness Of Breath    Other      DOG    Pollen Extract          Current Outpatient Medications   Medication Sig Dispense Refill    albuterol (PROVENTIL HFA,VENTOLIN HFA) 90 mcg/act inhaler Inhale 2 puffs every 6 (six) hours as needed for shortness of breath Rinse/spit after use 1 Inhaler 5    Biotin 10 MG CAPS Take by mouth      Flovent Diskus 250 MCG/BLIST AEPB Inhale 1 puff 2 (two) times a day Rinse/spit after use 1 each 5    fluticasone-salmeterol (ADVAIR) 250-50 mcg/dose inhaler Inhale 1 puff daily   montelukast (SINGULAIR) 10 mg tablet Take 1 tablet (10 mg total) by mouth daily 30 tablet 5    multivitamin (THERAGRAN) TABS Take 1 tablet by mouth daily      albuterol (PROVENTIL HFA,VENTOLIN HFA) 90 mcg/act inhaler Inhale 2 puffs every 6 (six) hours as needed for wheezing        meloxicam (MOBIC) 15 mg tablet Take 1 tablet (15 mg total) by mouth daily (Patient not taking: Reported on 4/15/2021) 15 tablet 0    montelukast (SINGULAIR) 10 mg tablet Take 10 mg by mouth daily at bedtime      Probiotic Product (PROBIOTIC-10 PO) Take by mouth       No current facility-administered medications for this visit  Physical Exam  Constitutional:       Appearance: Normal appearance  HENT:      Head: Normocephalic and atraumatic  Nose: Nose normal    Eyes:      Conjunctiva/sclera: Conjunctivae normal    Cardiovascular:      Rate and Rhythm: Normal rate and regular rhythm  Pulses: Normal pulses  Heart sounds: Normal heart sounds  No murmur  Pulmonary:      Effort: Pulmonary effort is normal       Breath sounds: Normal breath sounds  Skin:     General: Skin is warm and dry  Findings: No rash  Neurological:      Mental Status: She is alert and oriented to person, place, and time  GCS: GCS eye subscore is 4  GCS verbal subscore is 5  GCS motor subscore is 6  Sensory: Sensation is intact  Motor: Motor function is intact  Coordination: Coordination is intact  Gait: Gait is intact  Psychiatric:         Mood and Affect: Mood is anxious  Behavior: Behavior normal          Thought Content:  Thought content normal          Judgment: Judgment normal                      NICOLA Diaz

## 2021-04-28 LAB
25(OH)D3+25(OH)D2 SERPL-MCNC: 33.6 NG/ML (ref 30–100)
ALBUMIN SERPL-MCNC: 4.5 G/DL (ref 3.8–4.8)
ALBUMIN/GLOB SERPL: 1.8 {RATIO} (ref 1.2–2.2)
ALP SERPL-CCNC: 63 IU/L (ref 39–117)
ALT SERPL-CCNC: 15 IU/L (ref 0–32)
ANA HOMOGEN TITR SER: NORMAL {TITER}
ANA TITR SER IF: POSITIVE {TITER}
AST SERPL-CCNC: 19 IU/L (ref 0–40)
B BURGDOR IGG+IGM SER-ACNC: <0.91 ISR (ref 0–0.9)
B BURGDOR IGM SER IA-ACNC: <0.8 INDEX (ref 0–0.79)
BASOPHILS # BLD AUTO: 0.1 X10E3/UL (ref 0–0.2)
BASOPHILS NFR BLD AUTO: 1 %
BILIRUB SERPL-MCNC: 0.6 MG/DL (ref 0–1.2)
BUN SERPL-MCNC: 10 MG/DL (ref 6–24)
BUN/CREAT SERPL: 12 (ref 9–23)
CALCIUM SERPL-MCNC: 9.6 MG/DL (ref 8.7–10.2)
CCP IGA+IGG SERPL IA-ACNC: 3 UNITS (ref 0–19)
CHLORIDE SERPL-SCNC: 103 MMOL/L (ref 96–106)
CHOLEST SERPL-MCNC: 203 MG/DL (ref 100–199)
CO2 SERPL-SCNC: 23 MMOL/L (ref 20–29)
CREAT SERPL-MCNC: 0.83 MG/DL (ref 0.57–1)
CRP SERPL-MCNC: 1 MG/L (ref 0–10)
EOSINOPHIL # BLD AUTO: 0.1 X10E3/UL (ref 0–0.4)
EOSINOPHIL NFR BLD AUTO: 2 %
ERYTHROCYTE [DISTWIDTH] IN BLOOD BY AUTOMATED COUNT: 12.2 % (ref 11.7–15.4)
ERYTHROCYTE [SEDIMENTATION RATE] IN BLOOD BY WESTERGREN METHOD: 4 MM/HR (ref 0–32)
GLOBULIN SER-MCNC: 2.5 G/DL (ref 1.5–4.5)
GLUCOSE SERPL-MCNC: 96 MG/DL (ref 65–99)
HCT VFR BLD AUTO: 41.3 % (ref 34–46.6)
HDLC SERPL-MCNC: 90 MG/DL
HGB BLD-MCNC: 14.1 G/DL (ref 11.1–15.9)
IMM GRANULOCYTES # BLD: 0 X10E3/UL (ref 0–0.1)
IMM GRANULOCYTES NFR BLD: 0 %
LDLC SERPL CALC-MCNC: 103 MG/DL (ref 0–99)
LYMPHOCYTES # BLD AUTO: 2 X10E3/UL (ref 0.7–3.1)
LYMPHOCYTES NFR BLD AUTO: 34 %
MCH RBC QN AUTO: 32.6 PG (ref 26.6–33)
MCHC RBC AUTO-ENTMCNC: 34.1 G/DL (ref 31.5–35.7)
MCV RBC AUTO: 95 FL (ref 79–97)
MICRODELETION SYND BLD/T FISH: NORMAL
MONOCYTES # BLD AUTO: 0.6 X10E3/UL (ref 0.1–0.9)
MONOCYTES NFR BLD AUTO: 9 %
NEUTROPHILS # BLD AUTO: 3.2 X10E3/UL (ref 1.4–7)
NEUTROPHILS NFR BLD AUTO: 54 %
PLATELET # BLD AUTO: 276 X10E3/UL (ref 150–450)
POTASSIUM SERPL-SCNC: 4.4 MMOL/L (ref 3.5–5.2)
PROT SERPL-MCNC: 7 G/DL (ref 6–8.5)
RBC # BLD AUTO: 4.33 X10E6/UL (ref 3.77–5.28)
RHEUMATOID FACT SERPL-ACNC: 16.7 IU/ML (ref 0–13.9)
SL AMB EGFR AFRICAN AMERICAN: 98 ML/MIN/1.73
SL AMB EGFR NON AFRICAN AMERICAN: 85 ML/MIN/1.73
SL AMB NOTE:: NORMAL
SODIUM SERPL-SCNC: 138 MMOL/L (ref 134–144)
TRIGL SERPL-MCNC: 57 MG/DL (ref 0–149)
TSH SERPL DL<=0.005 MIU/L-ACNC: 1.71 UIU/ML (ref 0.45–4.5)
WBC # BLD AUTO: 6 X10E3/UL (ref 3.4–10.8)

## 2021-05-03 ENCOUNTER — OFFICE VISIT (OUTPATIENT)
Dept: FAMILY MEDICINE CLINIC | Facility: CLINIC | Age: 46
End: 2021-05-03
Payer: COMMERCIAL

## 2021-05-03 VITALS
SYSTOLIC BLOOD PRESSURE: 114 MMHG | HEART RATE: 82 BPM | HEIGHT: 63 IN | RESPIRATION RATE: 16 BRPM | TEMPERATURE: 98.4 F | WEIGHT: 159 LBS | BODY MASS INDEX: 28.17 KG/M2 | DIASTOLIC BLOOD PRESSURE: 70 MMHG

## 2021-05-03 DIAGNOSIS — J45.40 MODERATE PERSISTENT ASTHMA WITHOUT COMPLICATION: ICD-10-CM

## 2021-05-03 DIAGNOSIS — R76.8 ANA POSITIVE: ICD-10-CM

## 2021-05-03 DIAGNOSIS — Z00.00 HEALTHCARE MAINTENANCE: Primary | ICD-10-CM

## 2021-05-03 DIAGNOSIS — M79.10 MYALGIA: ICD-10-CM

## 2021-05-03 PROCEDURE — 99396 PREV VISIT EST AGE 40-64: CPT | Performed by: FAMILY MEDICINE

## 2021-05-03 PROCEDURE — 3008F BODY MASS INDEX DOCD: CPT | Performed by: NURSE PRACTITIONER

## 2021-05-03 RX ORDER — ALBUTEROL SULFATE 90 UG/1
2 AEROSOL, METERED RESPIRATORY (INHALATION) EVERY 6 HOURS PRN
Qty: 18 G | Refills: 2 | Status: SHIPPED | OUTPATIENT
Start: 2021-05-03

## 2021-05-03 NOTE — PROGRESS NOTES
Assessment/Plan:    No problem-specific Assessment & Plan notes found for this encounter  cpe    tx allergies with singulair and otc h1b  Avoid triggers if possible  Pulmonary f/u  Using monica frequently    Labs reviewed  ALEM borderline  Rheumatology offered  Has ongoing myalgias    F/u q6m    Work on diet/bmi/carbs    F/u if gets more anxiety attacks for daily med options  Declines seeing psychiatrist again  [facility] overmedicated with the meds in the past       Diagnoses and all orders for this visit:    Healthcare maintenance    ALEM positive  -     Ambulatory referral to Rheumatology; Future    Myalgia    Moderate persistent asthma without complication  -     albuterol (PROVENTIL HFA,VENTOLIN HFA) 90 mcg/act inhaler; Inhale 2 puffs every 6 (six) hours as needed for shortness of breath Rinse/spit after use  -     fluticasone-salmeterol (ADVAIR, WIXELA) 250-50 mcg/dose inhaler; Inhale 1 puff daily Rinse/spit after use        Return in about 6 months (around 11/3/2021)  Subjective:      Patient ID: Nanda Porras is a 54 y o  female  Chief Complaint   Patient presents with    Physical Exam     ac/cma       HPI  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    Walks in am  Mostly healthy diet  Mostly 1 meal per day  Does not prepare meals    Wt fluctuates    Limits some carbs    Asthma worse in spring  On advair 250/50 bid  Rinses after use  Using monica prn, more lately  Did not see pulm  Uses about 10-14 times a week    singulair used  Not on h1b    Psych 5y ago in [place]  meds in past  Anxiety attack in mid April    The following portions of the patient's history were reviewed and updated as appropriate: allergies, current medications, past family history, past medical history, past social history, past surgical history and problem list     Review of Systems   Constitutional: Negative for fever  Respiratory: Positive for shortness of breath  Cardiovascular: Negative for chest pain           Current Outpatient Medications   Medication Sig Dispense Refill    albuterol (PROVENTIL HFA,VENTOLIN HFA) 90 mcg/act inhaler Inhale 2 puffs every 6 (six) hours as needed for shortness of breath Rinse/spit after use 18 g 2    Biotin 10 MG CAPS Take by mouth      fluticasone-salmeterol (ADVAIR, WIXELA) 250-50 mcg/dose inhaler Inhale 1 puff daily Rinse/spit after use 1 Inhaler 5    montelukast (SINGULAIR) 10 mg tablet Take 1 tablet (10 mg total) by mouth daily 30 tablet 5    multivitamin (THERAGRAN) TABS Take 1 tablet by mouth daily      meloxicam (MOBIC) 15 mg tablet Take 1 tablet (15 mg total) by mouth daily (Patient not taking: Reported on 5/3/2021) 15 tablet 0    montelukast (SINGULAIR) 10 mg tablet Take 10 mg by mouth daily at bedtime      Probiotic Product (PROBIOTIC-10 PO) Take by mouth       No current facility-administered medications for this visit  Objective:    /70   Pulse 82   Temp 98 4 °F (36 9 °C)   Resp 16   Ht 5' 3" (1 6 m)   Wt 72 1 kg (159 lb)   BMI 28 17 kg/m²        Physical Exam  Vitals signs and nursing note reviewed  Constitutional:       General: She is not in acute distress  Appearance: She is well-developed  She is not ill-appearing  HENT:      Head: Normocephalic  Right Ear: Tympanic membrane normal       Left Ear: Tympanic membrane normal    Eyes:      General: No scleral icterus  Conjunctiva/sclera: Conjunctivae normal    Neck:      Musculoskeletal: Neck supple  Cardiovascular:      Rate and Rhythm: Normal rate and regular rhythm  Heart sounds: No murmur  Pulmonary:      Effort: Pulmonary effort is normal  No respiratory distress  Breath sounds: No wheezing  Abdominal:      General: There is no distension  Palpations: Abdomen is soft  Tenderness: There is no abdominal tenderness  Musculoskeletal:         General: No deformity  Right lower leg: No edema  Left lower leg: No edema  Skin:     General: Skin is warm and dry  Coloration: Skin is not jaundiced or pale  Neurological:      Mental Status: She is alert  Motor: No weakness  Gait: Gait normal    Psychiatric:         Behavior: Behavior normal          Thought Content: Thought content normal          BMI Counseling: Body mass index is 28 17 kg/m²  The BMI is above normal  Nutrition recommendations include decreasing portion sizes and moderation in carbohydrate intake  Exercise recommendations include exercising 3-5 times per week  No pharmacotherapy was ordered                Codey Heath DO

## 2021-05-05 ENCOUNTER — OFFICE VISIT (OUTPATIENT)
Dept: OBGYN CLINIC | Facility: CLINIC | Age: 46
End: 2021-05-05
Payer: COMMERCIAL

## 2021-05-05 VITALS
TEMPERATURE: 97.8 F | HEIGHT: 63 IN | WEIGHT: 159 LBS | DIASTOLIC BLOOD PRESSURE: 80 MMHG | BODY MASS INDEX: 28.17 KG/M2 | SYSTOLIC BLOOD PRESSURE: 106 MMHG

## 2021-05-05 DIAGNOSIS — Z12.4 PAP SMEAR FOR CERVICAL CANCER SCREENING: ICD-10-CM

## 2021-05-05 DIAGNOSIS — Z12.31 ENCOUNTER FOR SCREENING MAMMOGRAM FOR MALIGNANT NEOPLASM OF BREAST: ICD-10-CM

## 2021-05-05 DIAGNOSIS — Z01.419 WOMEN'S ANNUAL ROUTINE GYNECOLOGICAL EXAMINATION: Primary | ICD-10-CM

## 2021-05-05 PROCEDURE — 87624 HPV HI-RISK TYP POOLED RSLT: CPT | Performed by: OBSTETRICS & GYNECOLOGY

## 2021-05-05 PROCEDURE — 99386 PREV VISIT NEW AGE 40-64: CPT | Performed by: OBSTETRICS & GYNECOLOGY

## 2021-05-05 PROCEDURE — 3008F BODY MASS INDEX DOCD: CPT | Performed by: INTERNAL MEDICINE

## 2021-05-05 PROCEDURE — G0145 SCR C/V CYTO,THINLAYER,RESCR: HCPCS | Performed by: OBSTETRICS & GYNECOLOGY

## 2021-05-05 NOTE — PATIENT INSTRUCTIONS
Wellness Visit for Adults   AMBULATORY CARE:   A wellness visit  is when you see your healthcare provider to get screened for health problems  Your healthcare provider will also give you advice on how to stay healthy  Write down your questions so you remember to ask them  Ask your healthcare provider how often you should have a wellness visit  What happens at a wellness visit:  Your healthcare provider will ask about your health, and your family history of health problems  This includes high blood pressure, heart disease, and cancer  He or she will ask if you have symptoms that concern you, if you smoke, and about your mood  You may also be asked about your intake of medicines, supplements, food, and alcohol  Any of the following may be done:  · Your weight  will be checked  Your height may also be checked so your body mass index (BMI) can be calculated  Your BMI shows if you are at a healthy weight  · Your blood pressure  and heart rate will be checked  Your temperature may also be checked  · Blood and urine tests  may be done  Blood tests may be done to check your cholesterol levels  Abnormal cholesterol levels increase your risk for heart disease and stroke  You may also need a blood or urine test to check for diabetes if you are at increased risk  Urine tests may be done to look for signs of an infection or kidney disease  · A physical exam  includes checking your heartbeat and lungs with a stethoscope  Your healthcare provider may also check your skin to look for sun damage  · Screening tests  may be recommended  A screening test is done to check for diseases that may not cause symptoms  The screening tests you may need depend on your age, gender, family history, and lifestyle habits  For example, colorectal screening may be recommended if you are 48years old or older  Screening tests you need if you are a woman:   · A Pap smear  is used to screen for cervical cancer   Pap smears are usually done every 3 to 5 years depending on your age  You may need them more often if you have had abnormal Pap smear test results in the past  Ask your healthcare provider how often you should have a Pap smear  · A mammogram  is an x-ray of your breasts to screen for breast cancer  Experts recommend mammograms every 2 years starting at age 48 years  You may need a mammogram at age 52 years or younger if you have an increased risk for breast cancer  Talk to your healthcare provider about when you should start having mammograms and how often you need them  Vaccines you may need:   · Get an influenza vaccine  every year  The influenza vaccine protects you from the flu  Several types of viruses cause the flu  The viruses change over time, so new vaccines are made each year  · Get a tetanus-diphtheria (Td) booster vaccine  every 10 years  This vaccine protects you against tetanus and diphtheria  Tetanus is a severe infection that may cause painful muscle spasms and lockjaw  Diphtheria is a severe bacterial infection that causes a thick covering in the back of your mouth and throat  · Get a human papillomavirus (HPV) vaccine  if you are female and aged 23 to 32 or male 23 to 24 and never received it  This vaccine protects you from HPV infection  HPV is the most common infection spread by sexual contact  HPV may also cause vaginal, penile, and anal cancers  · Get a pneumococcal vaccine  if you are aged 72 years or older  The pneumococcal vaccine is an injection given to protect you from pneumococcal disease  Pneumococcal disease is an infection caused by pneumococcal bacteria  The infection may cause pneumonia, meningitis, or an ear infection  · Get a shingles vaccine  if you are 60 or older, even if you have had shingles before  The shingles vaccine is an injection to protect you from the varicella-zoster virus  This is the same virus that causes chickenpox   Shingles is a painful rash that develops in people who had chickenpox or have been exposed to the virus  How to eat healthy:  My Plate is a model for planning healthy meals  It shows the types and amounts of foods that should go on your plate  Fruits and vegetables make up about half of your plate, and grains and protein make up the other half  A serving of dairy is included on the side of your plate  The amount of calories and serving sizes you need depends on your age, gender, weight, and height  Examples of healthy foods are listed below:  · Eat a variety of vegetables  such as dark green, red, and orange vegetables  You can also include canned vegetables low in sodium (salt) and frozen vegetables without added butter or sauces  · Eat a variety of fresh fruits , canned fruit in 100% juice, frozen fruit, and dried fruit  · Include whole grains  At least half of the grains you eat should be whole grains  Examples include whole-wheat bread, wheat pasta, brown rice, and whole-grain cereals such as oatmeal     · Eat a variety of protein foods such as seafood (fish and shellfish), lean meat, and poultry without skin (turkey and chicken)  Examples of lean meats include pork leg, shoulder, or tenderloin, and beef round, sirloin, tenderloin, and extra lean ground beef  Other protein foods include eggs and egg substitutes, beans, peas, soy products, nuts, and seeds  · Choose low-fat dairy products such as skim or 1% milk or low-fat yogurt, cheese, and cottage cheese  · Limit unhealthy fats  such as butter, hard margarine, and shortening  Exercise:  Exercise at least 30 minutes per day on most days of the week  Some examples of exercise include walking, biking, dancing, and swimming  You can also fit in more physical activity by taking the stairs instead of the elevator or parking farther away from stores  Include muscle strengthening activities 2 days each week  Regular exercise provides many health benefits   It helps you manage your weight, and decreases your risk for type 2 diabetes, heart disease, stroke, and high blood pressure  Exercise can also help improve your mood  Ask your healthcare provider about the best exercise plan for you  General health and safety guidelines:   · Do not smoke  Nicotine and other chemicals in cigarettes and cigars can cause lung damage  Ask your healthcare provider for information if you currently smoke and need help to quit  E-cigarettes or smokeless tobacco still contain nicotine  Talk to your healthcare provider before you use these products  · Limit alcohol  A drink of alcohol is 12 ounces of beer, 5 ounces of wine, or 1½ ounces of liquor  · Lose weight, if needed  Being overweight increases your risk of certain health conditions  These include heart disease, high blood pressure, type 2 diabetes, and certain types of cancer  · Protect your skin  Do not sunbathe or use tanning beds  Use sunscreen with a SPF 15 or higher  Apply sunscreen at least 15 minutes before you go outside  Reapply sunscreen every 2 hours  Wear protective clothing, hats, and sunglasses when you are outside  · Drive safely  Always wear your seatbelt  Make sure everyone in your car wears a seatbelt  A seatbelt can save your life if you are in an accident  Do not use your cell phone when you are driving  This could distract you and cause an accident  Pull over if you need to make a call or send a text message  · Practice safe sex  Use latex condoms if are sexually active and have more than one partner  Your healthcare provider may recommend screening tests for sexually transmitted infections (STIs)  · Wear helmets, lifejackets, and protective gear  Always wear a helmet when you ride a bike or motorcycle, go skiing, or play sports that could cause a head injury  Wear protective equipment when you play sports  Wear a lifejacket when you are on a boat or doing water sports      © Copyright [employer] 2020 Information is for End User's use only and may not be sold, redistributed or otherwise used for commercial purposes  All illustrations and images included in CareNotes® are the copyrighted property of A D A M , Inc  or Jamar Hobson   The above information is an  only  It is not intended as medical advice for individual conditions or treatments  Talk to your doctor, nurse or pharmacist before following any medical regimen to see if it is safe and effective for you  Thank you for enrolling in [employer]  Please follow the instructions below to securely access your online medical record  [employer] allows you to send messages to your doctor, view your test results, renew your prescriptions, schedule appointments, and more  [facility] uses Single Sign on (SSO) Technology for you to log in and access our [facility] [address]  [place], including [employer]  No more remembering multiple user names and passwords! We are going to guide you through, step by step, to help you set up your [address] [place] [address] [employer] account which will provide access to your [employer] account  How Do I Sign Up? 1  In your Internet browser, go to [email or web address]      2  Click on the [facility] patient account and then click Dont have an                 Account? Create one now      3  Enter your demographic information and chose a user name (email address) and password  Think of one that is secure and easy to remember  Enter a Referral code if you have one (this is not your [employer] code ) Accept the Terms and Conditions and the Privacy Policy  4  Select your security questions that you will use to reset your password should you forget it  Click Submit  5  Enter your [employer] Activation Code exactly as it appears below  You will not need to use this code after you have completed the sign-up process  If you do not sign up before the expiration date, you must request a new code  [employer] Activation Code: [email or web address]  Expires: 5/17/2021  8:55 AM    6  Confirm your email address  An email confirmation was sent to you  Please open that email and click Confirm your Email   You should then be redirected to our [employer] Single sign on page, where you will log on with the user name and password you created! Proceed to the [employer] Icon to view your personal health information  Additional Information  If you have questions, you can e-mail patient  [email or web address] or call [phone] to talk to our customer support staff  Remember, [employer] is NOT to be used for urgent needs  For medical emergencies, dial 911

## 2021-05-05 NOTE — PROGRESS NOTES
Assessment/Plan:    Normal annual gynecological exam   Pap smear done   Given script for mammogram which is due   Encouraged to go online to find some lupus support groups for further information as she awaits her physician appointment  Return to office in 1 year earlier as needed       Problem List Items Addressed This Visit        Other    Women's annual routine gynecological examination - Primary    Pap smear for cervical cancer screening    Encounter for screening mammogram for malignant neoplasm of breast    Relevant Orders    Mammo screening bilateral w 3d & cad            Subjective:      Patient ID: Easter Nissen is a 54 y o  female  Becky Altamirano is here for annual gyn exam - overall well - menses are regular and not an issue with flow or cramping - bowels and bladder normal - needs mammogram and pap - recent diagnosis with lupus - has a small left labial lip and noted with washing - not tender       The following portions of the patient's history were reviewed and updated as appropriate:   She  has a past medical history of Asthma and Lupus ([facility] [address] ) (2021)  She   Patient Active Problem List    Diagnosis Date Noted    Women's annual routine gynecological examination 2021    Pap smear for cervical cancer screening 2021    Encounter for screening mammogram for malignant neoplasm of breast 2021    ALEM positive 2021    Healthcare maintenance 2021    Moderate persistent asthma without complication     Allergic rhinitis 2021    Pain of both shoulder joints 2021    Myalgia 2021    Other fatigue 2021    Screening for diabetes mellitus (DM) 2021    Screening for cardiovascular, respiratory, and genitourinary diseases 2021     She  has a past surgical history that includes Breast surgery; Rectal surgery;  section; Breast biopsy (Bilateral);  Breast cyst excision (Right, 2019); and Cataract extraction (Bilateral, 2019)  Her family history is not on file  She was adopted  She  reports that she has quit smoking  She started smoking about 10 years ago  She has never used smokeless tobacco  She reports current alcohol use  Drug: Marijuana  Current Outpatient Medications   Medication Sig Dispense Refill    albuterol (PROVENTIL HFA,VENTOLIN HFA) 90 mcg/act inhaler Inhale 2 puffs every 6 (six) hours as needed for shortness of breath Rinse/spit after use 18 g 2    Biotin 10 MG CAPS Take by mouth      fluticasone-salmeterol (ADVAIR, WIXELA) 250-50 mcg/dose inhaler Inhale 1 puff daily Rinse/spit after use 1 Inhaler 5    montelukast (SINGULAIR) 10 mg tablet Take 1 tablet (10 mg total) by mouth daily 30 tablet 5    multivitamin (THERAGRAN) TABS Take 1 tablet by mouth daily      montelukast (SINGULAIR) 10 mg tablet Take 10 mg by mouth daily at bedtime      Probiotic Product (PROBIOTIC-10 PO) Take by mouth       No current facility-administered medications for this visit  Current Outpatient Medications on File Prior to Visit   Medication Sig    albuterol (PROVENTIL HFA,VENTOLIN HFA) 90 mcg/act inhaler Inhale 2 puffs every 6 (six) hours as needed for shortness of breath Rinse/spit after use    Biotin 10 MG CAPS Take by mouth    fluticasone-salmeterol (ADVAIR, WIXELA) 250-50 mcg/dose inhaler Inhale 1 puff daily Rinse/spit after use    montelukast (SINGULAIR) 10 mg tablet Take 1 tablet (10 mg total) by mouth daily    multivitamin (THERAGRAN) TABS Take 1 tablet by mouth daily    montelukast (SINGULAIR) 10 mg tablet Take 10 mg by mouth daily at bedtime    Probiotic Product (PROBIOTIC-10 PO) Take by mouth    [DISCONTINUED] meloxicam (MOBIC) 15 mg tablet Take 1 tablet (15 mg total) by mouth daily (Patient not taking: Reported on 5/3/2021)     No current facility-administered medications on file prior to visit  She is allergic to penicillins; penicillins; other; and pollen extract       Review of Systems Constitutional: Negative for chills and fever  HENT: Negative for ear pain and sore throat  Eyes: Negative for pain and visual disturbance  Respiratory: Negative for cough and shortness of breath  Cardiovascular: Negative for chest pain and palpitations  Gastrointestinal: Negative for abdominal pain and vomiting  Genitourinary: Positive for vaginal pain  Negative for dysuria and hematuria  Lump in labial area   Musculoskeletal: Negative for arthralgias and back pain  Skin: Negative for color change and rash  Neurological: Negative for seizures and syncope  All other systems reviewed and are negative  Objective:      Temp 97 8 °F (36 6 °C) (Temporal)   Ht 5' 3" (1 6 m)   Wt 72 1 kg (159 lb)   LMP 04/16/2021 (Exact Date)   BMI 28 17 kg/m²          Physical Exam  Vitals signs reviewed  Constitutional:       General: She is not in acute distress  Appearance: Normal appearance  She is normal weight  She is not ill-appearing  Comments: Petite youthful white female    HENT:      Head: Normocephalic and atraumatic  Neck:      Musculoskeletal: Neck supple  No muscular tenderness  Thyroid: No thyromegaly or thyroid tenderness  Cardiovascular:      Rate and Rhythm: Normal rate and regular rhythm  Pulses: Normal pulses  Heart sounds: Normal heart sounds  No murmur  Pulmonary:      Effort: Pulmonary effort is normal  No respiratory distress  Breath sounds: Normal breath sounds  No wheezing  Chest:      Breasts: Breasts are symmetrical          Right: Normal  No swelling, inverted nipple, mass, nipple discharge, skin change or tenderness  Left: Normal  No swelling, inverted nipple, mass, nipple discharge, skin change or tenderness  Abdominal:      General: Abdomen is flat  There is no distension  Palpations: Abdomen is soft  There is no mass  Tenderness: There is no abdominal tenderness   There is no right CVA tenderness, left CVA tenderness, guarding or rebound  Hernia: No hernia is present  Comments: Well-healed midline lower abdominal scar   Genitourinary:     Comments: Normal female, no vulvar or vaginal lesions, Barbourmeade's and Bartholin glands are grossly normal   Urethra is in the midline and normal appearance  Cervix is grossly normal appearance and Pap smear is taken  Uterus is anterior grossly normal in size, shape and mobility  There are no adnexal masses  The exam is nontender  Rectal exam reveals normal sphincter tone, no palpable masses and stool is guaiac negative  Lymphadenopathy:      Upper Body:      Right upper body: No supraclavicular, axillary or pectoral adenopathy  Left upper body: No supraclavicular, axillary or pectoral adenopathy  Neurological:      General: No focal deficit present  Mental Status: She is alert and oriented to person, place, and time     Psychiatric:         Mood and Affect: Mood normal          Behavior: Behavior normal

## 2021-05-06 ENCOUNTER — TELEPHONE (OUTPATIENT)
Dept: FAMILY MEDICINE CLINIC | Facility: HOSPITAL | Age: 46
End: 2021-05-06

## 2021-05-07 ENCOUNTER — TELEPHONE (OUTPATIENT)
Dept: FAMILY MEDICINE CLINIC | Facility: CLINIC | Age: 46
End: 2021-05-07

## 2021-05-07 DIAGNOSIS — M79.10 MYALGIA: Primary | ICD-10-CM

## 2021-05-07 LAB
HPV HR 12 DNA CVX QL NAA+PROBE: POSITIVE
HPV16 DNA CVX QL NAA+PROBE: NEGATIVE
HPV18 DNA CVX QL NAA+PROBE: NEGATIVE

## 2021-05-07 RX ORDER — MELOXICAM 15 MG/1
15 TABLET ORAL DAILY PRN
Qty: 90 TABLET | Refills: 1 | Status: SHIPPED | OUTPATIENT
Start: 2021-05-07 | End: 2021-05-20

## 2021-05-07 NOTE — TELEPHONE ENCOUNTER
Patient was seen Monday by Dr Gus Mina  She stated she tested + for Lupus  She can not get in to see the specialist until July  In the meantime, she would like to know if  Dr Gus Mina can  prescribe her Naproxen for her pain?     Please call patient   [phone]

## 2021-05-13 LAB
LAB AP GYN PRIMARY INTERPRETATION: NORMAL
LAB AP LMP: NORMAL
Lab: NORMAL
PATH INTERP SPEC-IMP: NORMAL

## 2021-05-20 ENCOUNTER — OFFICE VISIT (OUTPATIENT)
Dept: RHEUMATOLOGY | Facility: CLINIC | Age: 46
End: 2021-05-20
Payer: COMMERCIAL

## 2021-05-20 VITALS
BODY MASS INDEX: 28.34 KG/M2 | HEART RATE: 61 BPM | WEIGHT: 160 LBS | SYSTOLIC BLOOD PRESSURE: 119 MMHG | TEMPERATURE: 98.6 F | DIASTOLIC BLOOD PRESSURE: 81 MMHG

## 2021-05-20 DIAGNOSIS — R76.8 ELEVATED RHEUMATOID FACTOR: ICD-10-CM

## 2021-05-20 DIAGNOSIS — M47.816 OSTEOARTHRITIS OF LUMBAR SPINE, UNSPECIFIED SPINAL OSTEOARTHRITIS COMPLICATION STATUS: ICD-10-CM

## 2021-05-20 DIAGNOSIS — M25.50 DIFFUSE ARTHRALGIA: ICD-10-CM

## 2021-05-20 DIAGNOSIS — R76.8 ANA POSITIVE: Primary | ICD-10-CM

## 2021-05-20 DIAGNOSIS — M79.7 FIBROMYALGIA: ICD-10-CM

## 2021-05-20 PROCEDURE — 99205 OFFICE O/P NEW HI 60 MIN: CPT | Performed by: INTERNAL MEDICINE

## 2021-05-20 RX ORDER — FLUTICASONE PROPIONATE 250 UG/1
1 POWDER, METERED RESPIRATORY (INHALATION) 2 TIMES DAILY
COMMUNITY
Start: 2021-05-17

## 2021-05-20 RX ORDER — NAPROXEN 500 MG/1
500 TABLET ORAL 2 TIMES DAILY PRN
Qty: 60 TABLET | Refills: 0 | Status: SHIPPED | OUTPATIENT
Start: 2021-05-20 | End: 2021-07-02 | Stop reason: SDUPTHER

## 2021-05-20 NOTE — PROGRESS NOTES
Assessment and Plan:   Ms Arlen Luis is a 69-year-old female with history significant for lumbar degenerative arthritis, who presents for further evaluation of a positive ALEM 1:80 homogeneous pattern and a borderline elevated rheumatoid factor at a titer of 16 7  She is referred by Dr Juan Miguel Rose for a rheumatology consult  Maxime Funes presents today for further evaluation of a borderline positive ALEM and rheumatoid factor that were detected in view of diffuse arthralgias/myalgias she has been experiencing for the past 5 years  She does not describe symptoms that would be concerning for an inflammatory arthritis and there is no synovitis noted on her examination today  She does demonstrate widespread myofascial tenderness which could be consistent with a diagnosis of fibromyalgia and I suspect that this is the primary etiology to her symptoms  To further evaluate the abnormal serologies I would like to complete the workup as listed below and see her back for an office visit to review the results  If there are no additional abnormalities noted then I will continue to monitor the ALEM and rheumatoid factor  In that case she will need treatment for fibromyalgia  - In the meanwhile I advised her to discontinue the meloxicam as it has been ineffective and I will prescribe her a course of naproxen 500 mg twice a day as needed but she is aware to take this on a sparing basis in order to avoid side effects related to chronic NSAID use  Plan:  Diagnoses and all orders for this visit:    ALEM positive  -     ALEM Comprehensive Panel; Future  -     CBC and differential; Future  -     Beta-2 glycoprotein antibodies; Future  -     Cardiolipin antibody; Future  -     Lupus anticoagulant; Future  -     C3 complement; Future  -     C4 complement; Future  -     Hepatitis B surface antigen; Future  -     Hepatitis C antibody;  Future  -     Protein / creatinine ratio, urine  -     Urinalysis with microscopic  -     Ferritin; Future  -     Ambulatory referral to Rheumatology    Elevated rheumatoid factor  -     XR hand 3+ vw right; Future  -     XR hand 3+ vw left; Future  -     XR foot 3+ vw left; Future  -     XR foot 3+ vw right; Future    Diffuse arthralgia  -     XR hand 3+ vw right; Future  -     XR hand 3+ vw left; Future  -     XR foot 3+ vw left; Future  -     XR foot 3+ vw right; Future  -     naproxen (NAPROSYN) 500 mg tablet; Take 1 tablet (500 mg total) by mouth 2 (two) times a day as needed for moderate pain    Fibromyalgia    Osteoarthritis of lumbar spine, unspecified spinal osteoarthritis complication status    Other orders  -     Flovent Diskus 250 MCG/BLIST AEPB; Inhale 1 puff 2 (two) times a day      I have personally reviewed pertinent films in PACS of the MRI lumbar spine which shows multilevel DJD  Activities as tolerated  Exercise: try to maintain a low impact exercise regimen as much as possible  Walk for 30 minutes a day for at least 3 days a week  Continue other medications as prescribed by PCP and other specialists  RTC in 4 weeks  HPI  Ms Claudia Taveras is a 56-year-old female with history significant for lumbar degenerative arthritis, who presents for further evaluation of a positive ALEM 1:80 homogeneous pattern and a borderline elevated rheumatoid factor at a titer of 16 7  She is referred by Dr Jac Jay for a rheumatology consult  Patient reports over the past 5 years she has experienced widespread body in joint pain which is not necessarily progressed over time but she will have episodes of worsening pain affecting different locations  She has noticed the most prominent pain to affect her bilateral shoulders and throughout her upper back  She will also experience pain in her hands, low back, hip region and throughout her arms and legs diffusely  No significant joint pains in her wrists, elbows, knees, ankles or feet  No joint swelling    She experiences morning stiffness which affects her hands and can take a few hours to improve  She has tried over-the-counter and prescription naproxen which does help with her symptoms and smoking marijuana also helped to some degree  She was recently prescribed meloxicam 15 mg once a day as needed by her primary care physician but this has been ineffective for her symptoms  She denies fevers, unintentional weight loss, alopecia, dry eyes, dry mouth, inflammatory eye disease, skin rash, psoriasis, photosensitivity, mouth/nose ulcers, persistently swollen glands, frequent pleuritic chest pain (she reports infrequently she may experience episodes of chest pain centrally that worsens with taking a deep breath and resolves spontaneously within 30 minutes), shortness of breath, inflammatory bowel disease, blood clots, miscarriages, Raynaud's (at times may notice a reddish discoloration to her hands with cold exposure) or a known family history of autoimmune disease  She is adopted but is in touch with her biological parents  In view of these complaints she had testing done which showed the positive ALEM and a borderline elevated rheumatoid factor  A CMP, ESR, CRP, anti CCP antibody, TSH, vitamin-D and Lyme antibody profile were unremarkable  The following portions of the patient's history were reviewed and updated as appropriate: allergies, current medications, past family history, past medical history, past social history, past surgical history and problem list       Review of Systems  Constitutional: Negative for weight change, fevers, chills, night sweats, fatigue  ENT/Mouth: Negative for hearing changes, ear pain, nasal congestion, sinus pain, hoarseness, sore throat, rhinorrhea, swallowing difficulty  Eyes: Negative for pain, redness, discharge, vision changes  Cardiovascular: Negative for chest pain, SOB, palpitations  Respiratory: Negative for cough, sputum, wheezing, dyspnea     Gastrointestinal: Negative for nausea, vomiting, diarrhea, constipation, pain, heartburn  Genitourinary: Negative for dysuria, urinary frequency, hematuria  Musculoskeletal: As per HPI  Skin: Negative for skin rash, color changes  Neuro: Negative for weakness, numbness, tingling, loss of consciousness  Psych: Negative for anxiety, depression  Heme/Lymph: Negative for easy bruising, bleeding, lymphadenopathy          Past Medical History:   Diagnosis Date    Asthma     Lupus ([facility] [address] ) 2021       Past Surgical History:   Procedure Laterality Date    BREAST BIOPSY Bilateral     benign    BREAST CYST EXCISION Right 2019    benign    BREAST SURGERY      CATARACT EXTRACTION Bilateral 2019     SECTION      RECTAL SURGERY         Social History     Socioeconomic History    Marital status:      Spouse name: Not on file    Number of children: Not on file    Years of education: Not on file    Highest education level: Not on file   Occupational History    Not on file   Social Needs    Financial resource strain: Not on file    Food insecurity     Worry: Not on file     Inability: Not on file    Transportation needs     Medical: Not on file     Non-medical: Not on file   Tobacco Use    Smoking status: Former Smoker     Start date: 2011    Smokeless tobacco: Never Used   Substance and Sexual Activity    Alcohol use: Yes     Comment: socially    Drug use: Yes     Types: Marijuana    Sexual activity: Yes   Lifestyle    Physical activity     Days per week: Not on file     Minutes per session: Not on file    Stress: Not on file   Relationships    Social connections     Talks on phone: Not on file     Gets together: Not on file     Attends [employer] service: Not on file     Active member of club or organization: Not on file     Attends meetings of clubs or organizations: Not on file     Relationship status: Not on file    Intimate partner violence     Fear of current or ex partner: Not on file     Emotionally abused: Not on file     Physically abused: Not on file     Forced sexual activity: Not on file   Other Topics Concern    Not on file   Social History Narrative    ** Merged History Encounter **            Family History   Adopted: Yes       Allergies   Allergen Reactions    Penicillins Shortness Of Breath    Penicillins Hives and Shortness Of Breath    Other      DOG    Pollen Extract        Current Outpatient Medications:     albuterol (PROVENTIL HFA,VENTOLIN HFA) 90 mcg/act inhaler, Inhale 2 puffs every 6 (six) hours as needed for shortness of breath Rinse/spit after use, Disp: 18 g, Rfl: 2    Biotin 10 MG CAPS, Take by mouth, Disp: , Rfl:     Flovent Diskus 250 MCG/BLIST AEPB, Inhale 1 puff 2 (two) times a day, Disp: , Rfl:     fluticasone-salmeterol (ADVAIR, WIXELA) 250-50 mcg/dose inhaler, Inhale 1 puff daily Rinse/spit after use, Disp: 1 Inhaler, Rfl: 5    montelukast (SINGULAIR) 10 mg tablet, Take 1 tablet (10 mg total) by mouth daily, Disp: 30 tablet, Rfl: 5    multivitamin (THERAGRAN) TABS, Take 1 tablet by mouth daily, Disp: , Rfl:     naproxen (NAPROSYN) 500 mg tablet, Take 1 tablet (500 mg total) by mouth 2 (two) times a day as needed for moderate pain, Disp: 60 tablet, Rfl: 0    Probiotic Product (PROBIOTIC-10 PO), Take by mouth, Disp: , Rfl:       Objective:    Vitals:    05/20/21 1259   BP: 119/81   BP Location: Left arm   Patient Position: Sitting   Cuff Size: Adult   Pulse: 61   Temp: 98 6 °F (37 °C)   Weight: 72 6 kg (160 lb)       Physical Exam  General: Well appearing, well nourished, in no distress  Oriented x 3, normal mood and affect  Ambulating without difficulty  Skin: Good turgor, no rash, unusual bruising or prominent lesions  Hair: Normal texture and distribution  Nails: Normal color, no deformities  HEENT:  Head: Normocephalic, atraumatic  Eyes: Conjunctiva clear, sclera non-icteric, EOM intact  Extremities: No amputations or deformities, cyanosis, edema    Musculoskeletal:   She reports mild discomfort to palpation of hands, right elbow and knees but there is no soft tissue swelling noted  No other significant joint tenderness noted and as mentioned there is no peripheral soft tissue swelling  She does have 18/18 positive fibromyalgia tender points as well as cervical myofascial tenderness noted  Neurologic: Alert and oriented  No focal neurological deficits appreciated  Psychiatric: Normal mood and affect  AJIT Castillo    Rheumatology

## 2021-05-24 ENCOUNTER — TRANSCRIBE ORDERS (OUTPATIENT)
Dept: ADMINISTRATIVE | Facility: HOSPITAL | Age: 46
End: 2021-05-24

## 2021-05-24 ENCOUNTER — HOSPITAL ENCOUNTER (OUTPATIENT)
Dept: RADIOLOGY | Facility: HOSPITAL | Age: 46
Discharge: HOME | End: 2021-05-24
Attending: INTERNAL MEDICINE
Payer: COMMERCIAL

## 2021-05-24 DIAGNOSIS — R76.8 ELEVATED RHEUMATOID FACTOR: ICD-10-CM

## 2021-05-24 DIAGNOSIS — M25.50 DIFFUSE ARTHRALGIA: ICD-10-CM

## 2021-05-24 PROCEDURE — 73630 X-RAY EXAM OF FOOT: CPT

## 2021-05-24 PROCEDURE — 73130 X-RAY EXAM OF HAND: CPT

## 2021-06-01 LAB
APPEARANCE UR: CLEAR
APTT HEX PL PPP: 0 SEC
APTT IMM NP PPP: NORMAL SEC
APTT PPP 1:1 SALINE: NORMAL SEC
APTT PPP: 26.7 SEC
B2 GLYCOPROT1 IGA SER-ACNC: <10 SAU
B2 GLYCOPROT1 IGA SER-ACNC: <9 GPI IGA UNITS (ref 0–25)
B2 GLYCOPROT1 IGG SER-ACNC: <10 SGU
B2 GLYCOPROT1 IGG SER-ACNC: <9 GPI IGG UNITS (ref 0–20)
B2 GLYCOPROT1 IGM SER-ACNC: <10 SMU
B2 GLYCOPROT1 IGM SER-ACNC: <9 GPI IGM UNITS (ref 0–32)
BACTERIA URNS QL MICRO: NORMAL
BASOPHILS # BLD AUTO: 0.1 X10E3/UL (ref 0–0.2)
BASOPHILS NFR BLD AUTO: 1 %
BILIRUB UR QL STRIP: NEGATIVE
C3 SERPL-MCNC: 92 MG/DL (ref 82–167)
C4 SERPL-MCNC: 23 MG/DL (ref 12–38)
CARDIOLIPIN IGG SER IA-ACNC: <10 GPL
CARDIOLIPIN IGM SER IA-ACNC: <10 MPL
CENTROMERE B AB SER-ACNC: <0.2 AI (ref 0–0.9)
CHROMATIN AB SERPL-ACNC: <0.2 AI (ref 0–0.9)
COLOR UR: YELLOW
CONFIRM DRVVT: NORMAL SEC
CREAT UR-MCNC: 75.6 MG/DL
DRVVT SCREEN TO CONFIRM RATIO: NORMAL RATIO
DSDNA AB SER-ACNC: 1 IU/ML (ref 0–9)
ENA JO1 AB SER-ACNC: <0.2 AI (ref 0–0.9)
ENA RNP AB SER-ACNC: <0.2 AI (ref 0–0.9)
ENA SCL70 AB SER-ACNC: <0.2 AI (ref 0–0.9)
ENA SM AB SER-ACNC: <0.2 AI (ref 0–0.9)
ENA SS-A AB SER-ACNC: <0.2 AI (ref 0–0.9)
ENA SS-B AB SER-ACNC: <0.2 AI (ref 0–0.9)
EOSINOPHIL # BLD AUTO: 0.1 X10E3/UL (ref 0–0.4)
EOSINOPHIL NFR BLD AUTO: 1 %
EPI CELLS #/AREA URNS HPF: NORMAL /HPF (ref 0–10)
ERYTHROCYTE [DISTWIDTH] IN BLOOD BY AUTOMATED COUNT: 12.3 % (ref 11.7–15.4)
FERRITIN SERPL-MCNC: 43 NG/ML (ref 15–150)
GLUCOSE UR QL: NEGATIVE
HBV SURFACE AG SERPL QL IA: NEGATIVE
HCT VFR BLD AUTO: 40.1 % (ref 34–46.6)
HCV AB S/CO SERPL IA: 0.2 S/CO RATIO (ref 0–0.9)
HGB BLD-MCNC: 13.3 G/DL (ref 11.1–15.9)
HGB UR QL STRIP: NEGATIVE
IMM GRANULOCYTES # BLD: 0 X10E3/UL (ref 0–0.1)
IMM GRANULOCYTES NFR BLD: 0 %
INR PPP: 1 RATIO
KETONES UR QL STRIP: NEGATIVE
LA PPP-IMP: NORMAL
LEUKOCYTE ESTERASE UR QL STRIP: NEGATIVE
LYMPHOCYTES # BLD AUTO: 2.7 X10E3/UL (ref 0.7–3.1)
LYMPHOCYTES NFR BLD AUTO: 29 %
MCH RBC QN AUTO: 31.2 PG (ref 26.6–33)
MCHC RBC AUTO-ENTMCNC: 33.2 G/DL (ref 31.5–35.7)
MCV RBC AUTO: 94 FL (ref 79–97)
MICRO URNS: NORMAL
MICRO URNS: NORMAL
MONOCYTES # BLD AUTO: 0.7 X10E3/UL (ref 0.1–0.9)
MONOCYTES NFR BLD AUTO: 7 %
MUCOUS THREADS URNS QL MICRO: PRESENT
NEUTROPHILS # BLD AUTO: 5.7 X10E3/UL (ref 1.4–7)
NEUTROPHILS NFR BLD AUTO: 62 %
NITRITE UR QL STRIP: NEGATIVE
PH UR STRIP: 6.5 [PH] (ref 5–7.5)
PLATELET # BLD AUTO: 255 X10E3/UL (ref 150–450)
PROT UR QL STRIP: NEGATIVE
PROT UR-MCNC: 5.8 MG/DL
PROT/CREAT UR: 77 MG/G CREAT (ref 0–200)
PROTHROMBIN TIME: 11 SEC
RBC # BLD AUTO: 4.26 X10E6/UL (ref 3.77–5.28)
RBC #/AREA URNS HPF: NORMAL /HPF (ref 0–2)
SCREEN DRVVT: 34.8 SEC
SL AMB SEE BELOW:: NORMAL
SP GR UR: 1.02 (ref 1–1.03)
THROMBIN TIME: 18.3 SEC
UROBILINOGEN UR STRIP-ACNC: 0.2 MG/DL (ref 0.2–1)
WBC # BLD AUTO: 9.1 X10E3/UL (ref 3.4–10.8)
WBC #/AREA URNS HPF: NORMAL /HPF (ref 0–5)

## 2021-06-03 ENCOUNTER — TELEPHONE (OUTPATIENT)
Dept: RHEUMATOLOGY | Facility: CLINIC | Age: 46
End: 2021-06-03

## 2021-06-04 RX ORDER — DULOXETIN HYDROCHLORIDE 30 MG/1
30 CAPSULE, DELAYED RELEASE ORAL DAILY
Qty: 30 CAPSULE | Refills: 2 | Status: CANCELLED | OUTPATIENT
Start: 2021-06-04

## 2021-06-04 NOTE — PROGRESS NOTES
Assessment and Plan:   Ms Hermann Mcarhtur is a 58-year-old female with history significant for lumbar degenerative arthritis, who presents for follow up of a positive ALEM 1:80 homogeneous pattern and a borderline elevated rheumatoid factor at a titer of 16 7        - Tirso Stevens presents today for follow up of a borderline positive ALEM and rheumatoid factor that were detected in view of diffuse arthralgias/myalgias she has been experiencing for the past 5 years  She does not describe symptoms that would be concerning for an inflammatory arthritis and there was no synovitis noted on her examination previously  She did demonstrate widespread myofascial tenderness which would be consistent with a diagnosis of fibromyalgia and I suspect that this is the primary etiology to her symptoms, especially as her additional serological work up was unrevealing     - I discussed this diagnosis with her in depth today and advised her on conservative measures which would include initiating an exercise regimen as well as ensuring good quality of sleep  As the next steps in treatment she may also continue the naproxen 500 mg twice a day as needed (meloxicam ineffective) and I reviewed additional medication options with her  She would like to avoid neuro modulating medications as she has been on them in the past for a history of anxiety and depression and was unable to tolerate them well  In view of this I opted to start her on cyclobenzaprine 10 mg twice a day as needed and reviewed the side effects with her  - In view of the positive ALEM and rheumatoid factor I will continue to monitor this for now and see her back for a follow-up in 1 year  Plan:  Diagnoses and all orders for this visit:    Fibromyalgia  -     cyclobenzaprine (FLEXERIL) 10 mg tablet;  Take 1 tablet (10 mg total) by mouth 2 (two) times a day as needed for muscle spasms    Osteoarthritis of lumbar spine, unspecified spinal osteoarthritis complication status    ALEM positive    Elevated rheumatoid factor    Other orders  -     Cancel: DULoxetine (CYMBALTA) 30 mg delayed release capsule; Take 1 capsule (30 mg total) by mouth daily      Activities as tolerated  Exercise: try to maintain a low impact exercise regimen as much as possible  Walk for 30 minutes a day for at least 3 days a week  Continue other medications as prescribed by PCP and other specialists  RTC in 1 year  HPI    INITIAL VISIT NOTE (5/2021):  Ms Toyin Kaur is a 77-year-old female with history significant for lumbar degenerative arthritis, who presents for further evaluation of a positive ALEM 1:80 homogeneous pattern and a borderline elevated rheumatoid factor at a titer of 16 7  She is referred by Dr Jayda Gary for a rheumatology consult        Patient reports over the past 5 years she has experienced widespread body in joint pain which is not necessarily progressed over time but she will have episodes of worsening pain affecting different locations  She has noticed the most prominent pain to affect her bilateral shoulders and throughout her upper back  She will also experience pain in her hands, low back, hip region and throughout her arms and legs diffusely  No significant joint pains in her wrists, elbows, knees, ankles or feet  No joint swelling  She experiences morning stiffness which affects her hands and can take a few hours to improve  She has tried over-the-counter and prescription naproxen which does help with her symptoms and smoking marijuana also helped to some degree    She was recently prescribed meloxicam 15 mg once a day as needed by her primary care physician but this has been ineffective for her symptoms      She denies fevers, unintentional weight loss, alopecia, dry eyes, dry mouth, inflammatory eye disease, skin rash, psoriasis, photosensitivity, mouth/nose ulcers, persistently swollen glands, frequent pleuritic chest pain (she reports infrequently she may experience episodes of chest pain centrally that worsens with taking a deep breath and resolves spontaneously within 30 minutes), shortness of breath, inflammatory bowel disease, blood clots, miscarriages, Raynaud's (at times may notice a reddish discoloration to her hands with cold exposure) or a known family history of autoimmune disease  She is adopted but is in touch with her biological parents      In view of these complaints she had testing done which showed the positive ALEM and a borderline elevated rheumatoid factor  A CMP, ESR, CRP, anti CCP antibody, TSH, vitamin-D and Lyme antibody profile were unremarkable  6/7/2021:  Patient presents for a follow-up today  I reviewed the x-rays of her hands and feet which did not show any inflammatory changes  Her labs showed a negative ALEM specificity, C3, C4, antiphospholipid antibody testing, urinalysis, urine protein creatinine ratio, ferritin and hepatitis panel  She denies any new complaints from the last office visit and continues to describe the chronic pain that we discussed at the last office visit  She did start taking the naproxen 500 mg twice a day as needed but this only provides her with mild relief  The following portions of the patient's history were reviewed and updated as appropriate: allergies, current medications, past family history, past medical history, past social history, past surgical history and problem list       Review of Systems  Constitutional: Negative for weight change, fevers, chills, night sweats, fatigue  ENT/Mouth: Negative for hearing changes, ear pain, nasal congestion, sinus pain, hoarseness, sore throat, rhinorrhea, swallowing difficulty  Eyes: Negative for pain, redness, discharge, vision changes  Cardiovascular: Negative for chest pain, SOB, palpitations  Respiratory: Negative for cough, sputum, wheezing, dyspnea     Gastrointestinal: Negative for nausea, vomiting, diarrhea, constipation, pain, heartburn  Genitourinary: Negative for dysuria, urinary frequency, hematuria  Musculoskeletal: As per HPI  Skin: Negative for skin rash, color changes  Neuro: Negative for weakness, numbness, tingling, loss of consciousness  Psych: Negative for anxiety, depression  Heme/Lymph: Negative for easy bruising, bleeding, lymphadenopathy          Past Medical History:   Diagnosis Date    Asthma     Lupus ([facility] [address] ) 2021       Past Surgical History:   Procedure Laterality Date    BREAST BIOPSY Bilateral     benign    BREAST CYST EXCISION Right 2019    benign    BREAST SURGERY      CATARACT EXTRACTION Bilateral 2019     SECTION      RECTAL SURGERY         Social History     Socioeconomic History    Marital status:      Spouse name: Not on file    Number of children: Not on file    Years of education: Not on file    Highest education level: Not on file   Occupational History    Not on file   Social Needs    Financial resource strain: Not on file    Food insecurity     Worry: Not on file     Inability: Not on file    Transportation needs     Medical: Not on file     Non-medical: Not on file   Tobacco Use    Smoking status: Former Smoker     Start date: 2011    Smokeless tobacco: Never Used   Substance and Sexual Activity    Alcohol use: Yes     Comment: socially    Drug use: Yes     Types: Marijuana    Sexual activity: Yes   Lifestyle    Physical activity     Days per week: Not on file     Minutes per session: Not on file    Stress: Not on file   Relationships    Social connections     Talks on phone: Not on file     Gets together: Not on file     Attends [employer] service: Not on file     Active member of club or organization: Not on file     Attends meetings of clubs or organizations: Not on file     Relationship status: Not on file    Intimate partner violence     Fear of current or ex partner: Not on file     Emotionally abused: Not on file     Physically abused: Not on file     Forced sexual activity: Not on file   Other Topics Concern    Not on file   Social History Narrative    ** Merged History Encounter **            Family History   Adopted: Yes       Allergies   Allergen Reactions    Penicillins Shortness Of Breath    Penicillins Hives and Shortness Of Breath    Other      DOG    Pollen Extract        Current Outpatient Medications:     albuterol (PROVENTIL HFA,VENTOLIN HFA) 90 mcg/act inhaler, Inhale 2 puffs every 6 (six) hours as needed for shortness of breath Rinse/spit after use, Disp: 18 g, Rfl: 2    Biotin 10 MG CAPS, Take by mouth, Disp: , Rfl:     cyclobenzaprine (FLEXERIL) 10 mg tablet, Take 1 tablet (10 mg total) by mouth 2 (two) times a day as needed for muscle spasms, Disp: 60 tablet, Rfl: 2    Flovent Diskus 250 MCG/BLIST AEPB, Inhale 1 puff 2 (two) times a day, Disp: , Rfl:     fluticasone-salmeterol (ADVAIR, WIXELA) 250-50 mcg/dose inhaler, Inhale 1 puff daily Rinse/spit after use, Disp: 1 Inhaler, Rfl: 5    montelukast (SINGULAIR) 10 mg tablet, Take 1 tablet (10 mg total) by mouth daily, Disp: 30 tablet, Rfl: 5    multivitamin (THERAGRAN) TABS, Take 1 tablet by mouth daily, Disp: , Rfl:     naproxen (NAPROSYN) 500 mg tablet, Take 1 tablet (500 mg total) by mouth 2 (two) times a day as needed for moderate pain, Disp: 60 tablet, Rfl: 0    Probiotic Product (PROBIOTIC-10 PO), Take by mouth, Disp: , Rfl:       Objective:    Vitals:    06/07/21 0924   BP: 114/77   BP Location: Left arm   Patient Position: Sitting   Cuff Size: Adult   Pulse: 69   Temp: 98 4 °F (36 9 °C)       Physical Exam  General: Well appearing, well nourished, in no distress  Oriented x 3, normal mood and affect  Ambulating without difficulty  Skin: Good turgor, no rash, unusual bruising or prominent lesions  Hair: Normal texture and distribution  Nails: Normal color, no deformities  HEENT:  Head: Normocephalic, atraumatic    Eyes: Conjunctiva clear, sclera non-icteric, EOM intact  Extremities: No amputations or deformities, cyanosis, edema  Musculoskeletal:  No swelling visualized  Neurologic: Alert and oriented  No focal neurological deficits appreciated  Psychiatric: Normal mood and affect  AJIT Celaya    Rheumatology

## 2021-06-07 ENCOUNTER — OFFICE VISIT (OUTPATIENT)
Dept: RHEUMATOLOGY | Facility: CLINIC | Age: 46
End: 2021-06-07
Payer: COMMERCIAL

## 2021-06-07 VITALS — HEART RATE: 69 BPM | TEMPERATURE: 98.4 F | SYSTOLIC BLOOD PRESSURE: 114 MMHG | DIASTOLIC BLOOD PRESSURE: 77 MMHG

## 2021-06-07 DIAGNOSIS — R76.8 ELEVATED RHEUMATOID FACTOR: ICD-10-CM

## 2021-06-07 DIAGNOSIS — M79.7 FIBROMYALGIA: Primary | ICD-10-CM

## 2021-06-07 DIAGNOSIS — M47.816 OSTEOARTHRITIS OF LUMBAR SPINE, UNSPECIFIED SPINAL OSTEOARTHRITIS COMPLICATION STATUS: ICD-10-CM

## 2021-06-07 DIAGNOSIS — R76.8 ANA POSITIVE: ICD-10-CM

## 2021-06-07 PROCEDURE — 1036F TOBACCO NON-USER: CPT | Performed by: INTERNAL MEDICINE

## 2021-06-07 PROCEDURE — 99214 OFFICE O/P EST MOD 30 MIN: CPT | Performed by: INTERNAL MEDICINE

## 2021-06-07 RX ORDER — CYCLOBENZAPRINE HCL 10 MG
10 TABLET ORAL 2 TIMES DAILY PRN
Qty: 60 TABLET | Refills: 2 | Status: SHIPPED | OUTPATIENT
Start: 2021-06-07 | End: 2022-07-29

## 2021-07-02 ENCOUNTER — TELEPHONE (OUTPATIENT)
Dept: OBGYN CLINIC | Facility: HOSPITAL | Age: 46
End: 2021-07-02

## 2021-07-02 DIAGNOSIS — M25.50 DIFFUSE ARTHRALGIA: ICD-10-CM

## 2021-07-02 RX ORDER — NAPROXEN 500 MG/1
500 TABLET ORAL 2 TIMES DAILY PRN
Qty: 180 TABLET | Refills: 1 | Status: SHIPPED | OUTPATIENT
Start: 2021-07-02 | End: 2022-03-24

## 2021-07-02 NOTE — TELEPHONE ENCOUNTER
Pt contacted Call Center requested refill of their medication  Medication Name:  Naproxen     Dosage of Med:  500 mg    Frequency of Med:  Take 1 tablet (500 mg total) by mouth 2 (two) times a day as needed for moderate pain    Remaining Medication:  6  Pharmacy and Location:  [facility] [address] ([address] [facility], [address] [facility] ([address] [address])       Pt  Preferred Callback Phone Number:  [phone]     Thank you  PLEASE ADVISE PATIENTS:    REFILL REQUESTS WILL BE PROCESSED WITHIN 24-48 HOURS

## 2021-07-16 ENCOUNTER — TELEPHONE (OUTPATIENT)
Dept: OBGYN CLINIC | Facility: HOSPITAL | Age: 46
End: 2021-07-16

## 2021-07-16 NOTE — TELEPHONE ENCOUNTER
If she wants she can  my last office note that has the fibromyalgia diagnosis listed in it  The flexeril if it is going to work will work right away, if she needs adjustment of the fibro medications she can also follow up with her PCP

## 2021-07-16 NOTE — TELEPHONE ENCOUNTER
Dr David Fraser    [phone]    Patient is  requesting a work letter stating that she was diagnosed with fibromyalgia  Patient will  in the office when completed  How long does Flexeril take to work?

## 2021-08-01 DIAGNOSIS — J45.40 MODERATE PERSISTENT ASTHMA WITHOUT COMPLICATION: ICD-10-CM

## 2021-08-02 RX ORDER — MONTELUKAST SODIUM 10 MG/1
TABLET ORAL
Qty: 30 TABLET | Refills: 5 | Status: SHIPPED | OUTPATIENT
Start: 2021-08-02 | End: 2022-01-27

## 2021-09-24 ENCOUNTER — OFFICE VISIT (OUTPATIENT)
Dept: FAMILY MEDICINE CLINIC | Facility: CLINIC | Age: 46
End: 2021-09-24
Payer: COMMERCIAL

## 2021-09-24 VITALS
WEIGHT: 157.6 LBS | SYSTOLIC BLOOD PRESSURE: 108 MMHG | TEMPERATURE: 98.4 F | BODY MASS INDEX: 27.93 KG/M2 | HEART RATE: 74 BPM | DIASTOLIC BLOOD PRESSURE: 74 MMHG | HEIGHT: 63 IN | RESPIRATION RATE: 18 BRPM

## 2021-09-24 DIAGNOSIS — R76.8 ANA POSITIVE: ICD-10-CM

## 2021-09-24 DIAGNOSIS — E66.3 OVERWEIGHT (BMI 25.0-29.9): ICD-10-CM

## 2021-09-24 DIAGNOSIS — M79.7 FIBROMYALGIA: Primary | ICD-10-CM

## 2021-09-24 DIAGNOSIS — J45.40 MODERATE PERSISTENT ASTHMA WITHOUT COMPLICATION: ICD-10-CM

## 2021-09-24 DIAGNOSIS — Z12.31 BREAST CANCER SCREENING BY MAMMOGRAM: ICD-10-CM

## 2021-09-24 PROCEDURE — 1036F TOBACCO NON-USER: CPT | Performed by: FAMILY MEDICINE

## 2021-09-24 PROCEDURE — 3008F BODY MASS INDEX DOCD: CPT | Performed by: FAMILY MEDICINE

## 2021-09-24 PROCEDURE — 99214 OFFICE O/P EST MOD 30 MIN: CPT | Performed by: FAMILY MEDICINE

## 2021-09-24 RX ORDER — GABAPENTIN 100 MG/1
300 CAPSULE ORAL
Qty: 90 CAPSULE | Refills: 1 | Status: SHIPPED | OUTPATIENT
Start: 2021-09-24 | End: 2021-10-15 | Stop reason: SDUPTHER

## 2021-09-24 NOTE — PATIENT INSTRUCTIONS
Please start gabapentin at 100mg at bedtime for the first week then 200mg at bedtime for the second week then 300mg at bedtime after that  You could stop at a dose lower than 300mg if you find it effective at a lower dose

## 2021-09-24 NOTE — PROGRESS NOTES
Assessment/Plan:    No problem-specific Assessment & Plan notes found for this encounter  Fibromyalgia per rheumatology after w/u, treatment was deferred by Dr Zaid Durham apparently  Pt hesitant to use any psych meds but after discussion, will be willing to try gabapentin slowly  Alternate rheumatologist given, Dr Renne Cogan stable on advair    ALEM positive, rheum suggested monitoring    bmi aware, encouraged diet/exercise even small amounts, to help also with fibromylagia     Diagnoses and all orders for this visit:    Fibromyalgia  -     gabapentin (NEURONTIN) 100 mg capsule; Take 3 capsules (300 mg total) by mouth daily at bedtime  -     Ambulatory referral to Rheumatology; Future    Breast cancer screening by mammogram  -     Mammo screening bilateral w 3d & cad; Future    Moderate persistent asthma without complication    ALEM positive    BMI 27 0-27 9,adult    Overweight (BMI 25 0-29  9)      Works in a store  I advised she stay active and do what she can so no work restrictions were given, if needed in future she will need to ask her rheumatologist    Return in about 1 month (around 10/24/2021) for [address]  Subjective:      Patient ID: Michael Frances is a 54 y o  female      Chief Complaint   Patient presents with    Hand Pain     both hands joints hurt  painful when making a fist  nm lpn       HPI  Right breast mammo in past, had lumpectomy, through [facility], benign  Dr Juan Carlos Calix    B/l hands  Pain  Severe  Months  Getting worse  Dx with fibromyaglia  Seen by Dr Zaid Durham    Body aches  Chronic  Naproxen taken daily  nsaids risks advised    Given flexeril w/o help  Rheum notes reviewed  Psychiatrist in past, 5y ago, in [address] her into "zombie", dull, flat    The following portions of the patient's history were reviewed and updated as appropriate: allergies, current medications, past family history, past medical history, past social history, past surgical history and problem list     Review of Systems   Constitutional: Negative for chills and fever  Respiratory: Negative for shortness of breath  Cardiovascular: Negative for chest pain  Musculoskeletal: Positive for arthralgias and myalgias  Negative for joint swelling and neck pain  Skin: Negative for color change and rash  Current Outpatient Medications   Medication Sig Dispense Refill    albuterol (PROVENTIL HFA,VENTOLIN HFA) 90 mcg/act inhaler Inhale 2 puffs every 6 (six) hours as needed for shortness of breath Rinse/spit after use 18 g 2    Flovent Diskus 250 MCG/BLIST AEPB Inhale 1 puff 2 (two) times a day      fluticasone-salmeterol (ADVAIR, WIXELA) 250-50 mcg/dose inhaler Inhale 1 puff daily Rinse/spit after use 1 Inhaler 5    montelukast (SINGULAIR) 10 mg tablet take 1 tablet by mouth once daily 30 tablet 5    multivitamin (THERAGRAN) TABS Take 1 tablet by mouth daily      naproxen (NAPROSYN) 500 mg tablet Take 1 tablet (500 mg total) by mouth 2 (two) times a day as needed for moderate pain 180 tablet 1    Biotin 10 MG CAPS Take by mouth (Patient not taking: Reported on 9/24/2021)      cyclobenzaprine (FLEXERIL) 10 mg tablet Take 1 tablet (10 mg total) by mouth 2 (two) times a day as needed for muscle spasms (Patient not taking: Reported on 9/24/2021) 60 tablet 2    gabapentin (NEURONTIN) 100 mg capsule Take 3 capsules (300 mg total) by mouth daily at bedtime 90 capsule 1    Probiotic Product (PROBIOTIC-10 PO) Take by mouth (Patient not taking: Reported on 9/24/2021)       No current facility-administered medications for this visit  Objective:    /74   Pulse 74   Temp 98 4 °F (36 9 °C)   Resp 18   Ht 5' 3" (1 6 m)   Wt 71 5 kg (157 lb 9 6 oz)   LMP 09/16/2021 (Exact Date)   BMI 27 92 kg/m²        Physical Exam  Vitals and nursing note reviewed  Constitutional:       General: She is not in acute distress  Appearance: She is well-developed  She is not ill-appearing     HENT:      Head: Normocephalic  Right Ear: Tympanic membrane normal       Left Ear: Tympanic membrane normal    Eyes:      Conjunctiva/sclera: Conjunctivae normal    Cardiovascular:      Rate and Rhythm: Normal rate and regular rhythm  Heart sounds: No murmur heard  Pulmonary:      Effort: Pulmonary effort is normal  No respiratory distress  Breath sounds: Wheezing present  Abdominal:      General: There is no distension  Palpations: Abdomen is soft  Tenderness: There is no abdominal tenderness  Musculoskeletal:         General: No swelling or deformity  Cervical back: Neck supple  Right lower leg: No edema  Left lower leg: No edema  Skin:     General: Skin is warm and dry  Coloration: Skin is not jaundiced  Neurological:      Mental Status: She is alert  Motor: No weakness  Gait: Gait normal       Deep Tendon Reflexes: Reflexes normal    Psychiatric:         Behavior: Behavior normal          Thought Content:  Thought content normal                 Divya Coronado DO

## 2021-09-25 PROBLEM — Z98.890 S/P LUMPECTOMY, RIGHT BREAST: Status: ACTIVE | Noted: 2021-09-25

## 2021-10-14 ENCOUNTER — TELEPHONE (OUTPATIENT)
Dept: FAMILY MEDICINE CLINIC | Facility: CLINIC | Age: 46
End: 2021-10-14

## 2021-10-15 DIAGNOSIS — M79.7 FIBROMYALGIA: ICD-10-CM

## 2021-10-15 RX ORDER — GABAPENTIN 300 MG/1
300 CAPSULE ORAL 2 TIMES DAILY
Qty: 60 CAPSULE | Refills: 5 | Status: SHIPPED | OUTPATIENT
Start: 2021-10-15 | End: 2022-07-29

## 2021-11-16 ENCOUNTER — APPOINTMENT (OUTPATIENT)
Dept: LAB | Facility: HOSPITAL | Age: 46
End: 2021-11-16
Payer: COMMERCIAL

## 2021-11-16 DIAGNOSIS — M35.9 DIFFUSE DISEASE OF CONNECTIVE TISSUE (HCC): ICD-10-CM

## 2021-11-16 DIAGNOSIS — M06.4 INFLAMMATORY POLYARTHROPATHY (HCC): ICD-10-CM

## 2021-11-16 LAB
ALBUMIN SERPL BCP-MCNC: 4.3 G/DL (ref 3.5–5)
ALP SERPL-CCNC: 70 U/L (ref 46–116)
ALT SERPL W P-5'-P-CCNC: 30 U/L (ref 12–78)
ANION GAP SERPL CALCULATED.3IONS-SCNC: 9 MMOL/L (ref 4–13)
AST SERPL W P-5'-P-CCNC: 20 U/L (ref 5–45)
BASOPHILS # BLD AUTO: 0.05 THOUSANDS/ΜL (ref 0–0.1)
BASOPHILS NFR BLD AUTO: 1 % (ref 0–1)
BILIRUB DIRECT SERPL-MCNC: 0.08 MG/DL (ref 0–0.2)
BILIRUB SERPL-MCNC: 0.32 MG/DL (ref 0.2–1)
BUN SERPL-MCNC: 24 MG/DL (ref 5–25)
C3 SERPL-MCNC: 94.5 MG/DL (ref 90–180)
C4 SERPL-MCNC: 27 MG/DL (ref 10–40)
CALCIUM SERPL-MCNC: 9.5 MG/DL (ref 8.3–10.1)
CHLORIDE SERPL-SCNC: 103 MMOL/L (ref 100–108)
CK SERPL-CCNC: 128 U/L (ref 26–192)
CO2 SERPL-SCNC: 26 MMOL/L (ref 21–32)
CREAT SERPL-MCNC: 0.96 MG/DL (ref 0.6–1.3)
CRP SERPL HS-MCNC: <0.9 MG/L
EOSINOPHIL # BLD AUTO: 0.1 THOUSAND/ΜL (ref 0–0.61)
EOSINOPHIL NFR BLD AUTO: 1 % (ref 0–6)
ERYTHROCYTE [DISTWIDTH] IN BLOOD BY AUTOMATED COUNT: 12.7 % (ref 11.6–15.1)
ERYTHROCYTE [SEDIMENTATION RATE] IN BLOOD: 5 MM/HOUR (ref 0–19)
GFR SERPL CREATININE-BSD FRML MDRD: 71 ML/MIN/1.73SQ M
GLUCOSE P FAST SERPL-MCNC: 87 MG/DL (ref 65–99)
HCT VFR BLD AUTO: 44.2 % (ref 34.8–46.1)
HGB BLD-MCNC: 14.4 G/DL (ref 11.5–15.4)
IGA SERPL-MCNC: 221 MG/DL (ref 70–400)
IGG SERPL-MCNC: 1010 MG/DL (ref 700–1600)
IGM SERPL-MCNC: 128 MG/DL (ref 40–230)
IMM GRANULOCYTES # BLD AUTO: 0.02 THOUSAND/UL (ref 0–0.2)
IMM GRANULOCYTES NFR BLD AUTO: 0 % (ref 0–2)
LYMPHOCYTES # BLD AUTO: 2.07 THOUSANDS/ΜL (ref 0.6–4.47)
LYMPHOCYTES NFR BLD AUTO: 29 % (ref 14–44)
MCH RBC QN AUTO: 31.2 PG (ref 26.8–34.3)
MCHC RBC AUTO-ENTMCNC: 32.6 G/DL (ref 31.4–37.4)
MCV RBC AUTO: 96 FL (ref 82–98)
MONOCYTES # BLD AUTO: 0.71 THOUSAND/ΜL (ref 0.17–1.22)
MONOCYTES NFR BLD AUTO: 10 % (ref 4–12)
NEUTROPHILS # BLD AUTO: 4.32 THOUSANDS/ΜL (ref 1.85–7.62)
NEUTS SEG NFR BLD AUTO: 59 % (ref 43–75)
NRBC BLD AUTO-RTO: 0 /100 WBCS
PLATELET # BLD AUTO: 284 THOUSANDS/UL (ref 149–390)
PMV BLD AUTO: 11.1 FL (ref 8.9–12.7)
POTASSIUM SERPL-SCNC: 4.4 MMOL/L (ref 3.5–5.3)
PROT SERPL-MCNC: 7.4 G/DL (ref 6.4–8.2)
RBC # BLD AUTO: 4.61 MILLION/UL (ref 3.81–5.12)
SODIUM SERPL-SCNC: 138 MMOL/L (ref 136–145)
WBC # BLD AUTO: 7.27 THOUSAND/UL (ref 4.31–10.16)

## 2021-11-16 PROCEDURE — 82550 ASSAY OF CK (CPK): CPT

## 2021-11-16 PROCEDURE — 80076 HEPATIC FUNCTION PANEL: CPT

## 2021-11-16 PROCEDURE — 86200 CCP ANTIBODY: CPT

## 2021-11-16 PROCEDURE — 86225 DNA ANTIBODY NATIVE: CPT

## 2021-11-16 PROCEDURE — 85025 COMPLETE CBC W/AUTO DIFF WBC: CPT

## 2021-11-16 PROCEDURE — 86162 COMPLEMENT TOTAL (CH50): CPT

## 2021-11-16 PROCEDURE — 84165 PROTEIN E-PHORESIS SERUM: CPT | Performed by: PATHOLOGY

## 2021-11-16 PROCEDURE — 86160 COMPLEMENT ANTIGEN: CPT

## 2021-11-16 PROCEDURE — 82784 ASSAY IGA/IGD/IGG/IGM EACH: CPT

## 2021-11-16 PROCEDURE — 86430 RHEUMATOID FACTOR TEST QUAL: CPT

## 2021-11-16 PROCEDURE — 85652 RBC SED RATE AUTOMATED: CPT

## 2021-11-16 PROCEDURE — 86431 RHEUMATOID FACTOR QUANT: CPT

## 2021-11-16 PROCEDURE — 84165 PROTEIN E-PHORESIS SERUM: CPT

## 2021-11-16 PROCEDURE — 86141 C-REACTIVE PROTEIN HS: CPT

## 2021-11-16 PROCEDURE — 36415 COLL VENOUS BLD VENIPUNCTURE: CPT

## 2021-11-16 PROCEDURE — 86618 LYME DISEASE ANTIBODY: CPT

## 2021-11-16 PROCEDURE — 86235 NUCLEAR ANTIGEN ANTIBODY: CPT

## 2021-11-16 PROCEDURE — 80048 BASIC METABOLIC PNL TOTAL CA: CPT

## 2021-11-16 PROCEDURE — 81374 HLA I TYPING 1 ANTIGEN LR: CPT

## 2021-11-16 PROCEDURE — 86038 ANTINUCLEAR ANTIBODIES: CPT

## 2021-11-16 PROCEDURE — 82164 ANGIOTENSIN I ENZYME TEST: CPT

## 2021-11-17 LAB
ACE SERPL-CCNC: 27 U/L (ref 14–82)
ALBUMIN SERPL ELPH-MCNC: 4.75 G/DL (ref 3.5–5)
ALBUMIN SERPL ELPH-MCNC: 63.3 % (ref 52–65)
ALPHA1 GLOB SERPL ELPH-MCNC: 0.29 G/DL (ref 0.1–0.4)
ALPHA1 GLOB SERPL ELPH-MCNC: 3.9 % (ref 2.5–5)
ALPHA2 GLOB SERPL ELPH-MCNC: 0.68 G/DL (ref 0.4–1.2)
ALPHA2 GLOB SERPL ELPH-MCNC: 9 % (ref 7–13)
B BURGDOR IGG+IGM SER-ACNC: 48
BETA GLOB ABNORMAL SERPL ELPH-MCNC: 0.44 G/DL (ref 0.4–0.8)
BETA1 GLOB SERPL ELPH-MCNC: 5.8 % (ref 5–13)
BETA2 GLOB SERPL ELPH-MCNC: 4.9 % (ref 2–8)
BETA2+GAMMA GLOB SERPL ELPH-MCNC: 0.37 G/DL (ref 0.2–0.5)
CCP AB SER IA-ACNC: 2.8
CH50 SERPL-ACNC: 58 U/ML
CRYOGLOB RF SER-ACNC: ABNORMAL [IU]/ML
DSDNA AB SER-ACNC: 1 IU/ML (ref 0–9)
ENA SS-A AB SER-ACNC: <0.2 AI (ref 0–0.9)
ENA SS-B AB SER-ACNC: <0.2 AI (ref 0–0.9)
GAMMA GLOB ABNORMAL SERPL ELPH-MCNC: 0.98 G/DL (ref 0.5–1.6)
GAMMA GLOB SERPL ELPH-MCNC: 13.1 % (ref 12–22)
IGG/ALB SER: 1.72 {RATIO} (ref 1.1–1.8)
PROT PATTERN SERPL ELPH-IMP: NORMAL
PROT SERPL-MCNC: 7.5 G/DL (ref 6.4–8.2)
RHEUMATOID FACT SER QL LA: POSITIVE
RYE IGE QN: NEGATIVE

## 2021-11-24 LAB — HLA-B27 QL NAA+PROBE: NEGATIVE

## 2022-01-27 DIAGNOSIS — J45.40 MODERATE PERSISTENT ASTHMA WITHOUT COMPLICATION: ICD-10-CM

## 2022-01-27 RX ORDER — MONTELUKAST SODIUM 10 MG/1
TABLET ORAL
Qty: 30 TABLET | Refills: 5 | Status: SHIPPED | OUTPATIENT
Start: 2022-01-27 | End: 2022-07-20

## 2022-04-08 ENCOUNTER — APPOINTMENT (OUTPATIENT)
Dept: LAB | Facility: HOSPITAL | Age: 47
End: 2022-04-08
Attending: INTERNAL MEDICINE
Payer: COMMERCIAL

## 2022-04-08 DIAGNOSIS — M05.79 RHEUMATOID ARTHRITIS, SEROPOSITIVE, MULTIPLE SITES (HCC): ICD-10-CM

## 2022-04-08 DIAGNOSIS — Z79.899 ENCOUNTER FOR LONG-TERM (CURRENT) USE OF OTHER MEDICATIONS: ICD-10-CM

## 2022-04-08 LAB
B-HCG SERPL-ACNC: <2 MIU/ML
CRP SERPL QL: 0.9 MG/L
ERYTHROCYTE [SEDIMENTATION RATE] IN BLOOD: 7 MM/HOUR (ref 0–19)

## 2022-04-08 PROCEDURE — 36415 COLL VENOUS BLD VENIPUNCTURE: CPT

## 2022-04-08 PROCEDURE — 85652 RBC SED RATE AUTOMATED: CPT

## 2022-04-08 PROCEDURE — 84702 CHORIONIC GONADOTROPIN TEST: CPT

## 2022-04-08 PROCEDURE — 86140 C-REACTIVE PROTEIN: CPT

## 2022-04-08 PROCEDURE — 86430 RHEUMATOID FACTOR TEST QUAL: CPT

## 2022-04-08 PROCEDURE — 86200 CCP ANTIBODY: CPT

## 2022-04-09 LAB — RHEUMATOID FACT SER QL LA: NEGATIVE

## 2022-04-12 LAB
Lab: NORMAL
MISCELLANEOUS LAB TEST RESULT: NORMAL

## 2022-05-11 ENCOUNTER — APPOINTMENT (OUTPATIENT)
Dept: LAB | Facility: HOSPITAL | Age: 47
End: 2022-05-11
Payer: COMMERCIAL

## 2022-05-11 DIAGNOSIS — M05.79 RHEUMATOID ARTHRITIS, SEROPOSITIVE, MULTIPLE SITES (HCC): ICD-10-CM

## 2022-05-11 LAB — ERYTHROCYTE [SEDIMENTATION RATE] IN BLOOD: 8 MM/HOUR (ref 0–19)

## 2022-05-11 PROCEDURE — 85652 RBC SED RATE AUTOMATED: CPT

## 2022-05-17 ENCOUNTER — OFFICE VISIT (OUTPATIENT)
Dept: URGENT CARE | Facility: CLINIC | Age: 47
End: 2022-05-17
Payer: COMMERCIAL

## 2022-05-17 VITALS
SYSTOLIC BLOOD PRESSURE: 121 MMHG | BODY MASS INDEX: 28.88 KG/M2 | RESPIRATION RATE: 18 BRPM | TEMPERATURE: 98.6 F | HEART RATE: 66 BPM | OXYGEN SATURATION: 99 % | DIASTOLIC BLOOD PRESSURE: 90 MMHG | WEIGHT: 163 LBS | HEIGHT: 63 IN

## 2022-05-17 DIAGNOSIS — J06.9 ACUTE URI: Primary | ICD-10-CM

## 2022-05-17 PROCEDURE — 99213 OFFICE O/P EST LOW 20 MIN: CPT | Performed by: FAMILY MEDICINE

## 2022-05-17 PROCEDURE — 3008F BODY MASS INDEX DOCD: CPT | Performed by: FAMILY MEDICINE

## 2022-05-17 PROCEDURE — 1036F TOBACCO NON-USER: CPT | Performed by: FAMILY MEDICINE

## 2022-05-17 RX ORDER — PREDNISONE 10 MG/1
10 TABLET ORAL EVERY MORNING
Qty: 7 TABLET | Refills: 0 | Status: SHIPPED | OUTPATIENT
Start: 2022-05-17 | End: 2022-05-24

## 2022-05-17 NOTE — LETTER
May 17, 2022     Patient: Manuela Levy   YOB: 1975   Date of Visit: 5/17/2022       To Whom It May Concern:    Manuela Levy was evaluated my office on 05/17/2022  Please excuse her absence  May return to work on 05/19/2022 or sooner should symptoms resolve  If you have any questions or concerns, please don't hesitate to call           Sincerely,        Alba Kraft MD

## 2022-05-17 NOTE — PROGRESS NOTES
[facility]        NAME: aJime Morales is a 51 y o  female  : 1975    MRN: [id number]  DATE: May 17, 2022  TIME: 1:48 PM    Assessment and Plan   Acute URI [J06 9]  1  Acute URI  predniSONE 10 mg tablet     Acute URI per clinical evaluation  No wheezing to indicate asthma flare up  Will prescribe a low-dose prednisone due to subjective test tightness  Advised on seasonal allergy control with a daily antihistamine such as Allegra, Claritin or Zyrtec  Patient Instructions     Follow up with PCP in 3-5 days  Proceed to  ER if symptoms worsen  Chief Complaint     Chief Complaint   Patient presents with    Cough     Cough for 2 days feels that she may have bronchitis hot and cold  sweats  History of Present Illness     48-year-old female with pertinent history of asthma, rheumatoid arthritis and fibromyalgia presents today with 3 days of URI symptoms concerning for possible bronchitis  Has been experiencing chills, diaphoresis, postnasal drip, rhinorrhea, dry throat, coughing, chest tightness and some dyspnea  Denies any obvious fevers, chest pain, abdominal symptoms or dizziness  Reports that mom had similar symptoms prior to the onset of hers  Review of Systems   Review of Systems   Constitutional: Positive for chills and diaphoresis  Negative for fever  HENT: Positive for postnasal drip, rhinorrhea and sore throat (dry throat)  Negative for congestion  Respiratory: Positive for cough, chest tightness, shortness of breath and wheezing  Cardiovascular: Negative for chest pain  Gastrointestinal: Negative for abdominal pain and nausea  Musculoskeletal: Positive for myalgias  RA and fibromyalgia   Allergic/Immunologic: Positive for environmental allergies  Neurological: Negative for dizziness and headaches         Current Medications       Current Outpatient Medications:     albuterol (PROVENTIL HFA,VENTOLIN HFA) 90 mcg/act inhaler, Inhale 2 puffs every 6 (six) hours as needed for shortness of breath Rinse/spit after use, Disp: 18 g, Rfl: 2    Biotin 10 MG CAPS, Take by mouth, Disp: , Rfl:     montelukast (SINGULAIR) 10 mg tablet, take 1 tablet by mouth once daily, Disp: 30 tablet, Rfl: 5    multivitamin (THERAGRAN) TABS, Take 1 tablet by mouth daily, Disp: , Rfl:     predniSONE 10 mg tablet, Take 1 tablet (10 mg total) by mouth every morning for 7 days, Disp: 7 tablet, Rfl: 0    cyclobenzaprine (FLEXERIL) 10 mg tablet, Take 1 tablet (10 mg total) by mouth 2 (two) times a day as needed for muscle spasms (Patient not taking: No sig reported), Disp: 60 tablet, Rfl: 2    Flovent Diskus 250 MCG/BLIST AEPB, Inhale 1 puff 2 (two) times a day (Patient not taking: Reported on 5/17/2022), Disp: , Rfl:     fluticasone-salmeterol (ADVAIR, WIXELA) 250-50 mcg/dose inhaler, Inhale 1 puff daily Rinse/spit after use (Patient not taking: Reported on 5/17/2022), Disp: 1 Inhaler, Rfl: 5    gabapentin (NEURONTIN) 300 mg capsule, Take 1 capsule (300 mg total) by mouth 2 (two) times a day (Patient not taking: Reported on 5/17/2022), Disp: 60 capsule, Rfl: 5    naproxen (NAPROSYN) 500 mg tablet, take 1 tablet by mouth twice a day if needed for moderate pain (Patient not taking: Reported on 5/17/2022), Disp: 180 tablet, Rfl: 0    Probiotic Product (PROBIOTIC-10 PO), Take by mouth (Patient not taking: No sig reported), Disp: , Rfl:     Current Allergies     Allergies as of 05/17/2022 - Reviewed 05/17/2022   Allergen Reaction Noted    Penicillins Shortness Of Breath 07/02/2016    Penicillins Hives and Shortness Of Breath 01/28/2021    Other  04/25/2017    Pollen extract  04/25/2017            The following portions of the patient's history were reviewed and updated as appropriate: allergies, current medications, past family history, past medical history, past social history, past surgical history and problem list      Past Medical History:   Diagnosis Date    Asthma     Lupus ([address] ) 2021       Past Surgical History:   Procedure Laterality Date    BREAST BIOPSY Bilateral     benign    BREAST CYST EXCISION Right 2019    benign    BREAST SURGERY      CATARACT EXTRACTION Bilateral 2019     SECTION      RECTAL SURGERY         Family History   Adopted: Yes         Medications have been verified  Objective   /90   Pulse 66   Temp 98 6 °F (37 °C)   Resp 18   Ht 5' 3" (1 6 m)   Wt 73 9 kg (163 lb)   LMP 2022   SpO2 99%   BMI 28 87 kg/m²   Patient's last menstrual period was 2022  Physical Exam     Physical Exam  Vitals and nursing note reviewed  Constitutional:       General: She is in acute distress  Appearance: Normal appearance  She is normal weight  She is not ill-appearing, toxic-appearing or diaphoretic  HENT:      Head: Normocephalic and atraumatic  Nose: Nose normal       Mouth/Throat:      Mouth: Mucous membranes are moist       Pharynx: No posterior oropharyngeal erythema  Eyes:      General:         Right eye: No discharge  Left eye: No discharge  Conjunctiva/sclera: Conjunctivae normal    Cardiovascular:      Rate and Rhythm: Normal rate and regular rhythm  Pulmonary:      Effort: Pulmonary effort is normal  No respiratory distress  Breath sounds: Normal breath sounds  No wheezing, rhonchi or rales  Skin:     General: Skin is warm  Findings: No erythema  Neurological:      General: No focal deficit present  Mental Status: She is alert and oriented to person, place, and time  Psychiatric:         Mood and Affect: Mood normal          Behavior: Behavior normal          Thought Content:  Thought content normal          Judgment: Judgment normal

## 2022-07-20 DIAGNOSIS — J45.40 MODERATE PERSISTENT ASTHMA WITHOUT COMPLICATION: ICD-10-CM

## 2022-07-20 RX ORDER — MONTELUKAST SODIUM 10 MG/1
TABLET ORAL
Qty: 30 TABLET | Refills: 5 | Status: SHIPPED | OUTPATIENT
Start: 2022-07-20

## 2022-07-24 RX ORDER — PREGABALIN 50 MG/1
50 CAPSULE ORAL 2 TIMES DAILY
COMMUNITY
Start: 2022-05-29 | End: 2022-07-29

## 2022-07-24 RX ORDER — FOLIC ACID 1 MG/1
1000 TABLET ORAL DAILY
COMMUNITY
Start: 2022-07-08

## 2022-07-24 RX ORDER — PREGABALIN 75 MG/1
75 CAPSULE ORAL 2 TIMES DAILY
COMMUNITY
Start: 2022-07-12

## 2022-07-29 ENCOUNTER — OFFICE VISIT (OUTPATIENT)
Dept: FAMILY MEDICINE CLINIC | Facility: CLINIC | Age: 47
End: 2022-07-29
Payer: COMMERCIAL

## 2022-07-29 VITALS
OXYGEN SATURATION: 99 % | TEMPERATURE: 96.5 F | HEIGHT: 63 IN | WEIGHT: 146.4 LBS | HEART RATE: 78 BPM | RESPIRATION RATE: 18 BRPM | BODY MASS INDEX: 25.94 KG/M2 | SYSTOLIC BLOOD PRESSURE: 122 MMHG | DIASTOLIC BLOOD PRESSURE: 74 MMHG

## 2022-07-29 DIAGNOSIS — Z00.00 HEALTHCARE MAINTENANCE: Primary | ICD-10-CM

## 2022-07-29 DIAGNOSIS — E66.3 OVERWEIGHT (BMI 25.0-29.9): ICD-10-CM

## 2022-07-29 DIAGNOSIS — Z12.11 COLON CANCER SCREENING: ICD-10-CM

## 2022-07-29 DIAGNOSIS — R53.83 OTHER FATIGUE: ICD-10-CM

## 2022-07-29 DIAGNOSIS — Z13.89 SCREENING FOR CARDIOVASCULAR, RESPIRATORY, AND GENITOURINARY DISEASES: ICD-10-CM

## 2022-07-29 DIAGNOSIS — Z13.6 SCREENING FOR CARDIOVASCULAR, RESPIRATORY, AND GENITOURINARY DISEASES: ICD-10-CM

## 2022-07-29 DIAGNOSIS — Z13.83 SCREENING FOR CARDIOVASCULAR, RESPIRATORY, AND GENITOURINARY DISEASES: ICD-10-CM

## 2022-07-29 PROBLEM — Z12.31 ENCOUNTER FOR SCREENING MAMMOGRAM FOR MALIGNANT NEOPLASM OF BREAST: Status: RESOLVED | Noted: 2021-05-05 | Resolved: 2022-07-29

## 2022-07-29 PROBLEM — M25.512 PAIN OF BOTH SHOULDER JOINTS: Status: RESOLVED | Noted: 2021-01-28 | Resolved: 2022-07-29

## 2022-07-29 PROBLEM — M79.10 MYALGIA: Status: RESOLVED | Noted: 2021-01-28 | Resolved: 2022-07-29

## 2022-07-29 PROBLEM — M25.511 PAIN OF BOTH SHOULDER JOINTS: Status: RESOLVED | Noted: 2021-01-28 | Resolved: 2022-07-29

## 2022-07-29 PROCEDURE — 3725F SCREEN DEPRESSION PERFORMED: CPT | Performed by: FAMILY MEDICINE

## 2022-07-29 PROCEDURE — 99396 PREV VISIT EST AGE 40-64: CPT | Performed by: FAMILY MEDICINE

## 2022-07-29 NOTE — PROGRESS NOTES
Assessment/Plan:    No problem-specific Assessment & Plan notes found for this encounter  cpe  Fibromyalgia per Dr Kelsy Rollins     Diagnoses and all orders for this visit:    Healthcare maintenance    Colon cancer screening    Screening for cardiovascular, respiratory, and genitourinary diseases  -     Lipid Panel with Direct LDL reflex; Future    Other fatigue  -     TSH, 3rd generation; Future    Overweight (BMI 25 0-29  9)    BMI 26 0-26 9,adult    Other orders  -     folic acid (FOLVITE) 1 mg tablet; Take 1,000 mcg by mouth daily  -     methotrexate 2 5 mg tablet; TAKE 7 TABLETS BY MOUTH ONCE A WEEK WITH FOOD  -     Discontinue: pregabalin (LYRICA) 50 mg capsule; Take 50 mg by mouth 2 (two) times a day (Patient not taking: Reported on 7/29/2022)  -     pregabalin (LYRICA) 75 mg capsule; Take 75 mg by mouth 2 (two) times a day        Return if symptoms worsen or fail to improve  Subjective:      Patient ID: Nadine Cheney is a 51 y o  female  Chief Complaint   Patient presents with    Annual Exam     Nm lpn       HPI  Rheum Dr Kelsy Rollins currently  Been to Dr Verena Curling in past  On mtx    Not covid immunized    Not eating healthy  Not much fried/fat  Not watching carbs  Some exercise    Drinks water  Has a lot of coffee    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    Some stress  Not with BF anymore  Declines psychology eval  MJ daily    The following portions of the patient's history were reviewed and updated as appropriate: allergies, current medications, past family history, past medical history, past social history, past surgical history and problem list     Review of Systems   Constitutional: Positive for fatigue  Negative for chills and fever           Current Outpatient Medications   Medication Sig Dispense Refill    albuterol (PROVENTIL HFA,VENTOLIN HFA) 90 mcg/act inhaler Inhale 2 puffs every 6 (six) hours as needed for shortness of breath Rinse/spit after use 18 g 2    Biotin 10 MG CAPS Take by mouth  fluticasone-salmeterol (ADVAIR, WIXELA) 250-50 mcg/dose inhaler Inhale 1 puff daily Rinse/spit after use 1 Inhaler 5    folic acid (FOLVITE) 1 mg tablet Take 1,000 mcg by mouth daily      methotrexate 2 5 mg tablet TAKE 7 TABLETS BY MOUTH ONCE A WEEK WITH FOOD      montelukast (SINGULAIR) 10 mg tablet take 1 tablet by mouth once daily 30 tablet 5    multivitamin (THERAGRAN) TABS Take 1 tablet by mouth daily      naproxen (NAPROSYN) 500 mg tablet take 1 tablet by mouth twice a day if needed for moderate pain 180 tablet 0    pregabalin (LYRICA) 75 mg capsule Take 75 mg by mouth 2 (two) times a day      Flovent Diskus 250 MCG/BLIST AEPB Inhale 1 puff 2 (two) times a day (Patient not taking: Reported on 7/29/2022)       No current facility-administered medications for this visit  Objective:    /74   Pulse 78   Temp (!) 96 5 °F (35 8 °C)   Resp 18   Ht 5' 2 75" (1 594 m)   Wt 66 4 kg (146 lb 6 4 oz)   SpO2 99%   BMI 26 14 kg/m²        Physical Exam  Vitals and nursing note reviewed  Constitutional:       General: She is not in acute distress  Appearance: She is well-developed  She is not ill-appearing  HENT:      Head: Normocephalic  Right Ear: Tympanic membrane normal       Left Ear: Tympanic membrane normal    Eyes:      General: No scleral icterus  Conjunctiva/sclera: Conjunctivae normal    Neck:      Thyroid: No thyromegaly  Cardiovascular:      Rate and Rhythm: Normal rate and regular rhythm  Heart sounds: No murmur heard  Pulmonary:      Effort: Pulmonary effort is normal  No respiratory distress  Breath sounds: No wheezing  Abdominal:      General: There is no distension  Palpations: Abdomen is soft  Tenderness: There is no abdominal tenderness  Musculoskeletal:         General: No deformity  Cervical back: Neck supple  Right lower leg: No edema  Left lower leg: No edema  Skin:     General: Skin is warm and dry  Coloration: Skin is not pale  Neurological:      Mental Status: She is alert  Motor: No weakness  Gait: Gait normal    Psychiatric:         Mood and Affect: Mood normal          Behavior: Behavior normal          Thought Content:  Thought content normal                 Norm Valentin, DO

## 2022-08-07 ENCOUNTER — TELEPHONE (OUTPATIENT)
Dept: OTHER | Facility: OTHER | Age: 47
End: 2022-08-07

## 2022-08-08 NOTE — TELEPHONE ENCOUNTER
Patient is calling regarding cancelling an appointment  Date/Time: 08/11/22 w/ Yasmin Gardner PA-C    Patient was rescheduled: YES [] NO [x]    Patient requesting call back to reschedule: YES [x] NO [] - asking for a call back to r/s

## 2022-08-15 ENCOUNTER — OFFICE VISIT (OUTPATIENT)
Dept: FAMILY MEDICINE CLINIC | Facility: CLINIC | Age: 47
End: 2022-08-15
Payer: COMMERCIAL

## 2022-08-15 VITALS
WEIGHT: 139 LBS | OXYGEN SATURATION: 98 % | HEART RATE: 76 BPM | HEIGHT: 63 IN | SYSTOLIC BLOOD PRESSURE: 126 MMHG | TEMPERATURE: 98.6 F | BODY MASS INDEX: 24.63 KG/M2 | RESPIRATION RATE: 18 BRPM | DIASTOLIC BLOOD PRESSURE: 82 MMHG

## 2022-08-15 DIAGNOSIS — R21 RASH: ICD-10-CM

## 2022-08-15 DIAGNOSIS — J45.40 MODERATE PERSISTENT ASTHMA WITHOUT COMPLICATION: ICD-10-CM

## 2022-08-15 DIAGNOSIS — Z12.31 BREAST CANCER SCREENING BY MAMMOGRAM: ICD-10-CM

## 2022-08-15 DIAGNOSIS — H66.002 NON-RECURRENT ACUTE SUPPURATIVE OTITIS MEDIA OF LEFT EAR WITHOUT SPONTANEOUS RUPTURE OF TYMPANIC MEMBRANE: ICD-10-CM

## 2022-08-15 DIAGNOSIS — J20.9 ACUTE BRONCHITIS, UNSPECIFIED ORGANISM: Primary | ICD-10-CM

## 2022-08-15 PROCEDURE — 99214 OFFICE O/P EST MOD 30 MIN: CPT | Performed by: FAMILY MEDICINE

## 2022-08-15 PROCEDURE — 3725F SCREEN DEPRESSION PERFORMED: CPT | Performed by: FAMILY MEDICINE

## 2022-08-15 RX ORDER — AZITHROMYCIN 250 MG/1
TABLET, FILM COATED ORAL
Qty: 6 TABLET | Refills: 0 | Status: SHIPPED | OUTPATIENT
Start: 2022-08-15 | End: 2022-08-20

## 2022-08-15 NOTE — PROGRESS NOTES
Assessment/Plan:    No problem-specific Assessment & Plan notes found for this encounter  Early left OM  mucinex DM    Acute bronchitis  zpack rx  Risks/benefits aware    Subacute bruise lateral breast, right side  Update mammogram  Gyn f/u  Suspect minor trauma as cause    Right finger #2 rash  Papulovesicular  Localized  Not itchy or tender  Suggest cortisone otc  F/u if no better  Possible dermatology    Asthma stable     Diagnoses and all orders for this visit:    Acute bronchitis, unspecified organism  -     azithromycin (ZITHROMAX) 250 mg tablet; Take 500mg on day 1, 250mg on days 2-5    Non-recurrent acute suppurative otitis media of left ear without spontaneous rupture of tympanic membrane    Breast cancer screening by mammogram  -     Mammo screening bilateral w 3d & cad; Future    Rash    Moderate persistent asthma without complication      The patient was advised about the office disability policy and that no forms of this nature would be promised or completed  This was explained clearly to the patient's understanding  Return if symptoms worsen or fail to improve  Subjective:      Patient ID: Krysta Diaz is a 51 y o  female  Chief Complaint   Patient presents with    Cold Like Symptoms    Cough     Sas/cma       HPI  uri sx  3rd day  Very runny nose  Throat pain, severe  Malaise  No fever  Sore throat better today  Mucus is yellow-green  No sick contacts  On methotrexate    No covid vax in past    Right finger  #2 rash  5d  No exposures  New  Not itchy or tender  One finger    Right breast bruise  nontender  Has gyn  Dr Mary Murray per pt    The following portions of the patient's history were reviewed and updated as appropriate: allergies, current medications, past family history, past medical history, past social history, past surgical history and problem list     Review of Systems   Respiratory: Positive for cough  Negative for shortness of breath            Current Outpatient Medications   Medication Sig Dispense Refill    albuterol (PROVENTIL HFA,VENTOLIN HFA) 90 mcg/act inhaler Inhale 2 puffs every 6 (six) hours as needed for shortness of breath Rinse/spit after use 18 g 2    azithromycin (ZITHROMAX) 250 mg tablet Take 500mg on day 1, 250mg on days 2-5 6 tablet 0    Biotin 10 MG CAPS Take by mouth      Flovent Diskus 250 MCG/BLIST AEPB Inhale 1 puff 2 (two) times a day      fluticasone-salmeterol (ADVAIR, WIXELA) 250-50 mcg/dose inhaler Inhale 1 puff daily Rinse/spit after use 1 Inhaler 5    folic acid (FOLVITE) 1 mg tablet Take 1,000 mcg by mouth daily      methotrexate 2 5 mg tablet TAKE 7 TABLETS BY MOUTH ONCE A WEEK WITH FOOD      montelukast (SINGULAIR) 10 mg tablet take 1 tablet by mouth once daily 30 tablet 5    multivitamin (THERAGRAN) TABS Take 1 tablet by mouth daily      naproxen (NAPROSYN) 500 mg tablet take 1 tablet by mouth twice a day if needed for moderate pain 180 tablet 0    pregabalin (LYRICA) 75 mg capsule Take 75 mg by mouth 2 (two) times a day       No current facility-administered medications for this visit  Objective:    /82   Pulse 76   Temp 98 6 °F (37 °C)   Resp 18   Ht 5' 2 75" (1 594 m)   Wt 63 kg (139 lb)   LMP 08/09/2022 (Exact Date)   SpO2 98%   BMI 24 82 kg/m²        Physical Exam  Vitals and nursing note reviewed  Constitutional:       General: She is not in acute distress  Appearance: She is well-developed  She is not ill-appearing  HENT:      Head: Normocephalic  Right Ear: Tympanic membrane and external ear normal  There is no impacted cerumen  Left Ear: External ear normal  There is no impacted cerumen  Ears:      Comments: Left TM slightly injected  Eyes:      General: No scleral icterus  Conjunctiva/sclera: Conjunctivae normal    Cardiovascular:      Rate and Rhythm: Normal rate and regular rhythm  Pulmonary:      Effort: Pulmonary effort is normal  No respiratory distress        Breath sounds: No wheezing  Comments: Coarse midline cough present  Abdominal:      General: There is no distension  Palpations: Abdomen is soft  Tenderness: There is no abdominal tenderness  Musculoskeletal:         General: No deformity  Cervical back: Neck supple  Lymphadenopathy:      Cervical: No cervical adenopathy  Skin:     General: Skin is warm and dry  Coloration: Skin is not pale  Neurological:      Mental Status: She is alert  Motor: No weakness  Gait: Gait normal    Psychiatric:         Mood and Affect: Mood normal          Behavior: Behavior normal          Thought Content:  Thought content normal                 Nikki Michael, DO

## 2022-08-15 NOTE — LETTER
August 15, 2022     Patient: Penny Zapata  YOB: 1975  Date of Visit: 8/15/2022      To Whom it May Concern:    Penny Zapata is under my professional care  Maureen Martin was seen in my office on 8/15/2022  Excuse from work on 8/14 and 8/15/22  Also excuse on 8/16/22 if needed  If you have any questions or concerns, please don't hesitate to call           Sincerely,          Lindsay Roth DO        CC: No Recipients

## 2022-08-24 ENCOUNTER — TELEPHONE (OUTPATIENT)
Dept: FAMILY MEDICINE CLINIC | Facility: CLINIC | Age: 47
End: 2022-08-24

## 2022-08-24 DIAGNOSIS — J20.9 ACUTE BRONCHITIS, UNSPECIFIED ORGANISM: Primary | ICD-10-CM

## 2022-08-24 RX ORDER — DOXYCYCLINE 100 MG/1
100 CAPSULE ORAL 2 TIMES DAILY
Qty: 20 CAPSULE | Refills: 0 | Status: SHIPPED | OUTPATIENT
Start: 2022-08-24 | End: 2022-09-03

## 2022-08-24 NOTE — TELEPHONE ENCOUNTER
DR Krissy Dillard     Patient had a zpack last week  She finished it and isnt feeling much better  She cant come in, but she wanted to know if you could give her something else? Patient is allergic to penicllin

## 2022-09-22 ENCOUNTER — APPOINTMENT (OUTPATIENT)
Dept: LAB | Facility: HOSPITAL | Age: 47
End: 2022-09-22
Payer: COMMERCIAL

## 2022-09-22 DIAGNOSIS — Z13.6 SCREENING FOR CARDIOVASCULAR, RESPIRATORY, AND GENITOURINARY DISEASES: ICD-10-CM

## 2022-09-22 DIAGNOSIS — M05.79 SEROPOSITIVE RHEUMATOID ARTHRITIS OF MULTIPLE SITES (HCC): ICD-10-CM

## 2022-09-22 DIAGNOSIS — R53.83 OTHER FATIGUE: ICD-10-CM

## 2022-09-22 DIAGNOSIS — Z13.83 SCREENING FOR CARDIOVASCULAR, RESPIRATORY, AND GENITOURINARY DISEASES: ICD-10-CM

## 2022-09-22 DIAGNOSIS — Z13.89 SCREENING FOR CARDIOVASCULAR, RESPIRATORY, AND GENITOURINARY DISEASES: ICD-10-CM

## 2022-09-22 LAB
ALBUMIN SERPL BCP-MCNC: 3.5 G/DL (ref 3.5–5)
ALP SERPL-CCNC: 75 U/L (ref 46–116)
ALT SERPL W P-5'-P-CCNC: 21 U/L (ref 12–78)
ANION GAP SERPL CALCULATED.3IONS-SCNC: 8 MMOL/L (ref 4–13)
AST SERPL W P-5'-P-CCNC: 16 U/L (ref 5–45)
BASOPHILS # BLD AUTO: 0.05 THOUSANDS/ΜL (ref 0–0.1)
BASOPHILS NFR BLD AUTO: 1 % (ref 0–1)
BILIRUB SERPL-MCNC: 0.39 MG/DL (ref 0.2–1)
BUN SERPL-MCNC: 11 MG/DL (ref 5–25)
CALCIUM SERPL-MCNC: 9.1 MG/DL (ref 8.3–10.1)
CHLORIDE SERPL-SCNC: 105 MMOL/L (ref 96–108)
CHOLEST SERPL-MCNC: 157 MG/DL
CO2 SERPL-SCNC: 29 MMOL/L (ref 21–32)
CREAT SERPL-MCNC: 0.81 MG/DL (ref 0.6–1.3)
CRP SERPL QL: 1.3 MG/L
EOSINOPHIL # BLD AUTO: 0.11 THOUSAND/ΜL (ref 0–0.61)
EOSINOPHIL NFR BLD AUTO: 2 % (ref 0–6)
ERYTHROCYTE [DISTWIDTH] IN BLOOD BY AUTOMATED COUNT: 14.1 % (ref 11.6–15.1)
GFR SERPL CREATININE-BSD FRML MDRD: 86 ML/MIN/1.73SQ M
GLUCOSE P FAST SERPL-MCNC: 87 MG/DL (ref 65–99)
HCT VFR BLD AUTO: 39 % (ref 34.8–46.1)
HDLC SERPL-MCNC: 61 MG/DL
HGB BLD-MCNC: 13.1 G/DL (ref 11.5–15.4)
IMM GRANULOCYTES # BLD AUTO: 0.01 THOUSAND/UL (ref 0–0.2)
IMM GRANULOCYTES NFR BLD AUTO: 0 % (ref 0–2)
LDLC SERPL CALC-MCNC: 89 MG/DL (ref 0–100)
LYMPHOCYTES # BLD AUTO: 1.74 THOUSANDS/ΜL (ref 0.6–4.47)
LYMPHOCYTES NFR BLD AUTO: 26 % (ref 14–44)
MCH RBC QN AUTO: 32.1 PG (ref 26.8–34.3)
MCHC RBC AUTO-ENTMCNC: 33.6 G/DL (ref 31.4–37.4)
MCV RBC AUTO: 96 FL (ref 82–98)
MONOCYTES # BLD AUTO: 0.8 THOUSAND/ΜL (ref 0.17–1.22)
MONOCYTES NFR BLD AUTO: 12 % (ref 4–12)
NEUTROPHILS # BLD AUTO: 4.12 THOUSANDS/ΜL (ref 1.85–7.62)
NEUTS SEG NFR BLD AUTO: 59 % (ref 43–75)
NRBC BLD AUTO-RTO: 0 /100 WBCS
PLATELET # BLD AUTO: 300 THOUSANDS/UL (ref 149–390)
PMV BLD AUTO: 11.5 FL (ref 8.9–12.7)
POTASSIUM SERPL-SCNC: 3.7 MMOL/L (ref 3.5–5.3)
PROT SERPL-MCNC: 6.8 G/DL (ref 6.4–8.4)
RBC # BLD AUTO: 4.08 MILLION/UL (ref 3.81–5.12)
SODIUM SERPL-SCNC: 142 MMOL/L (ref 135–147)
TRIGL SERPL-MCNC: 35 MG/DL
TSH SERPL DL<=0.05 MIU/L-ACNC: 0.64 UIU/ML (ref 0.45–4.5)
WBC # BLD AUTO: 6.83 THOUSAND/UL (ref 4.31–10.16)

## 2022-09-22 PROCEDURE — 85025 COMPLETE CBC W/AUTO DIFF WBC: CPT

## 2022-09-22 PROCEDURE — 84443 ASSAY THYROID STIM HORMONE: CPT

## 2022-09-22 PROCEDURE — 80061 LIPID PANEL: CPT

## 2022-09-22 PROCEDURE — 80053 COMPREHEN METABOLIC PANEL: CPT

## 2022-09-22 PROCEDURE — 36415 COLL VENOUS BLD VENIPUNCTURE: CPT

## 2022-09-22 PROCEDURE — 86140 C-REACTIVE PROTEIN: CPT

## 2022-10-11 PROBLEM — Z12.11 COLON CANCER SCREENING: Status: RESOLVED | Noted: 2022-07-29 | Resolved: 2022-10-11

## 2022-10-12 PROBLEM — Z13.6 SCREENING FOR CARDIOVASCULAR, RESPIRATORY, AND GENITOURINARY DISEASES: Status: RESOLVED | Noted: 2021-01-28 | Resolved: 2022-10-12

## 2022-10-12 PROBLEM — Z13.1 SCREENING FOR DIABETES MELLITUS (DM): Status: RESOLVED | Noted: 2021-01-28 | Resolved: 2022-10-12

## 2022-10-12 PROBLEM — Z00.00 HEALTHCARE MAINTENANCE: Status: RESOLVED | Noted: 2021-05-03 | Resolved: 2022-10-12

## 2022-10-12 PROBLEM — Z13.89 SCREENING FOR CARDIOVASCULAR, RESPIRATORY, AND GENITOURINARY DISEASES: Status: RESOLVED | Noted: 2021-01-28 | Resolved: 2022-10-12

## 2022-10-12 PROBLEM — Z13.83 SCREENING FOR CARDIOVASCULAR, RESPIRATORY, AND GENITOURINARY DISEASES: Status: RESOLVED | Noted: 2021-01-28 | Resolved: 2022-10-12

## 2022-10-14 PROBLEM — H66.002 NON-RECURRENT ACUTE SUPPURATIVE OTITIS MEDIA OF LEFT EAR WITHOUT SPONTANEOUS RUPTURE OF TYMPANIC MEMBRANE: Status: RESOLVED | Noted: 2022-08-15 | Resolved: 2022-10-14

## 2022-11-30 ENCOUNTER — HOSPITAL ENCOUNTER (EMERGENCY)
Facility: HOSPITAL | Age: 47
Discharge: HOME | End: 2022-11-30
Attending: EMERGENCY MEDICINE

## 2022-11-30 VITALS
OXYGEN SATURATION: 100 % | TEMPERATURE: 98.1 F | SYSTOLIC BLOOD PRESSURE: 115 MMHG | DIASTOLIC BLOOD PRESSURE: 80 MMHG | RESPIRATION RATE: 24 BRPM | HEART RATE: 90 BPM

## 2022-11-30 DIAGNOSIS — J11.1 INFLUENZA-LIKE ILLNESS: Primary | ICD-10-CM

## 2022-11-30 DIAGNOSIS — J45.901 ASTHMA EXACERBATION: ICD-10-CM

## 2022-11-30 LAB
FLUAV RNA RESP QL NAA+PROBE: NEGATIVE
FLUBV RNA RESP QL NAA+PROBE: NEGATIVE
RSV RNA RESP QL NAA+PROBE: NEGATIVE
SARS-COV-2 RNA RESP QL NAA+PROBE: POSITIVE

## 2022-11-30 RX ORDER — PREDNISONE 20 MG/1
40 TABLET ORAL DAILY
Qty: 10 TABLET | Refills: 0 | Status: SHIPPED | OUTPATIENT
Start: 2022-11-30 | End: 2022-11-30 | Stop reason: SDUPTHER

## 2022-11-30 RX ORDER — ALBUTEROL SULFATE 90 UG/1
2 AEROSOL, METERED RESPIRATORY (INHALATION) EVERY 6 HOURS PRN
Qty: 18 G | Refills: 0 | Status: SHIPPED | OUTPATIENT
Start: 2022-11-30 | End: 2022-12-30

## 2022-11-30 RX ORDER — ALBUTEROL SULFATE 90 UG/1
2 AEROSOL, METERED RESPIRATORY (INHALATION) EVERY 6 HOURS PRN
Qty: 18 G | Refills: 0 | Status: SHIPPED | OUTPATIENT
Start: 2022-11-30 | End: 2022-11-30 | Stop reason: SDUPTHER

## 2022-11-30 RX ORDER — BENZONATATE 100 MG/1
100 CAPSULE ORAL 3 TIMES DAILY PRN
Qty: 20 CAPSULE | Refills: 0 | Status: SHIPPED | OUTPATIENT
Start: 2022-11-30

## 2022-11-30 RX ORDER — BENZONATATE 100 MG/1
100 CAPSULE ORAL 3 TIMES DAILY PRN
Qty: 20 CAPSULE | Refills: 0 | Status: SHIPPED | OUTPATIENT
Start: 2022-11-30 | End: 2022-11-30 | Stop reason: SDUPTHER

## 2022-11-30 RX ORDER — PREDNISONE 20 MG/1
40 TABLET ORAL DAILY
Qty: 10 TABLET | Refills: 0 | Status: SHIPPED | OUTPATIENT
Start: 2022-11-30 | End: 2022-12-05

## 2022-11-30 RX ORDER — ALBUTEROL SULFATE 2.5 MG/3ML
2.5 SOLUTION RESPIRATORY (INHALATION) EVERY 6 HOURS PRN
Qty: 75 ML | Refills: 0 | Status: SHIPPED | OUTPATIENT
Start: 2022-11-30

## 2022-11-30 RX ORDER — ALBUTEROL SULFATE 2.5 MG/3ML
2.5 SOLUTION RESPIRATORY (INHALATION) EVERY 6 HOURS PRN
Qty: 75 ML | Refills: 0 | Status: SHIPPED | OUTPATIENT
Start: 2022-11-30 | End: 2022-11-30 | Stop reason: SDUPTHER

## 2022-11-30 NOTE — Clinical Note
Anny Hou was seen and treated in our emergency department on 11/30/2022  Diagnosis:     Mo Dobson  may return to work on return date  She may return on this date: 12/03/2022         If you have any questions or concerns, please don't hesitate to call        Tg Trevino PA-C    ______________________________           _______________          _______________  Hospital Representative                              Date                                Time

## 2022-11-30 NOTE — ED PROVIDER NOTES
History  Chief Complaint   Patient presents with   • Shortness of Breath     Pt reports sick X 1 week, has asthma, woke this am and rescue inhaler wasn't helping  Pt satting @ 100 in triage, lungs clear     14-year-old female presenting today with feeling generally unwell over the past 4 days with sick contacts in the home with similar symptoms  Has had cough, congestion, chest tightness  Relays in her inhalers with minimal relief  Notes significant body aches and fatigue as well as mild sore throat  Notes headaches  Denies nausea, vomiting, calf pain or swelling, rash, neck pain or stiffness  Prior to Admission Medications   Prescriptions Last Dose Informant Patient Reported? Taking?    Biotin 10 MG CAPS   Yes No   Sig: Take by mouth   Flovent Diskus 250 MCG/BLIST AEPB   Yes No   Sig: Inhale 1 puff 2 (two) times a day   albuterol (PROVENTIL HFA,VENTOLIN HFA) 90 mcg/act inhaler   No No   Sig: Inhale 2 puffs every 6 (six) hours as needed for shortness of breath Rinse/spit after use   fluticasone-salmeterol (ADVAIR, WIXELA) 250-50 mcg/dose inhaler   No No   Sig: Inhale 1 puff daily Rinse/spit after use   folic acid (FOLVITE) 1 mg tablet   Yes No   Sig: Take 1,000 mcg by mouth daily   methotrexate 2 5 mg tablet   Yes No   Sig: TAKE 7 TABLETS BY MOUTH ONCE A WEEK WITH FOOD   montelukast (SINGULAIR) 10 mg tablet   No No   Sig: take 1 tablet by mouth once daily   multivitamin (THERAGRAN) TABS   Yes No   Sig: Take 1 tablet by mouth daily   naproxen (NAPROSYN) 500 mg tablet   No No   Sig: take 1 tablet by mouth twice a day if needed for moderate pain   pregabalin (LYRICA) 75 mg capsule   Yes No   Sig: Take 75 mg by mouth 2 (two) times a day      Facility-Administered Medications: None       Past Medical History:   Diagnosis Date   • Asthma    • Lupus ([address] ) 05/03/2021       Past Surgical History:   Procedure Laterality Date   • BREAST BIOPSY Bilateral     benign   • BREAST CYST EXCISION Right 02/2019    benign • BREAST SURGERY     • CATARACT EXTRACTION Bilateral 2019   •  SECTION     • RECTAL SURGERY         Family History   Adopted: Yes     I have reviewed and agree with the history as documented  E-Cigarette/Vaping   • E-Cigarette Use Never User      E-Cigarette/Vaping Substances   • Nicotine No    • THC No    • CBD No    • Flavoring No    • Other No    • Unknown No      Social History     Tobacco Use   • Smoking status: Former     Types: Cigarettes     Start date: 2011   • Smokeless tobacco: Never   Vaping Use   • Vaping Use: Never used   Substance Use Topics   • Alcohol use: Not Currently     Comment: socially   • Drug use: Yes     Types: Marijuana       Review of Systems   Constitutional: Negative  Negative for appetite change, chills and fever  HENT: Positive for sore throat  Negative for congestion, dental problem, ear pain, facial swelling, mouth sores, postnasal drip, sinus pressure and trouble swallowing  Eyes: Negative  Respiratory: Positive for cough and chest tightness  Negative for apnea, choking, shortness of breath, wheezing and stridor  Cardiovascular: Negative  Negative for chest pain  Gastrointestinal: Negative  Negative for abdominal distention, abdominal pain, blood in stool, constipation, diarrhea, nausea and vomiting  Genitourinary: Negative  Musculoskeletal: Negative  Negative for arthralgias, neck pain and neck stiffness  Skin: Negative  Negative for rash  Neurological: Positive for headaches  Negative for dizziness, tremors, seizures, syncope, facial asymmetry, speech difficulty, weakness, light-headedness and numbness  All other systems reviewed and are negative  Physical Exam  Physical Exam  Vitals and nursing note reviewed  Constitutional:       General: She is not in acute distress  Appearance: Normal appearance  She is normal weight  She is not ill-appearing, toxic-appearing or diaphoretic     HENT:      Head: Normocephalic and atraumatic  Right Ear: External ear normal       Left Ear: External ear normal       Nose: Nose normal       Mouth/Throat:      Mouth: Mucous membranes are moist       Pharynx: Oropharynx is clear  Eyes:      Conjunctiva/sclera: Conjunctivae normal    Cardiovascular:      Rate and Rhythm: Normal rate  Pulses: Normal pulses  Heart sounds: Normal heart sounds  No murmur heard  No friction rub  No gallop  Pulmonary:      Effort: Pulmonary effort is normal  No respiratory distress  Breath sounds: No wheezing or rales  Comments: S PO2 is 100% indicating adequate oxygenation, speaking full sentences without difficulty good aeration throughout all lung fields  Abdominal:      General: Abdomen is flat  Bowel sounds are normal  There is no distension  Musculoskeletal:         General: No deformity  Normal range of motion  Cervical back: Normal range of motion  Skin:     General: Skin is warm and dry  Capillary Refill: Capillary refill takes less than 2 seconds  Findings: No rash  Neurological:      General: No focal deficit present  Mental Status: She is alert and oriented to person, place, and time  Mental status is at baseline  Psychiatric:         Mood and Affect: Mood normal          Behavior: Behavior normal          Thought Content:  Thought content normal          Judgment: Judgment normal          Vital Signs  ED Triage Vitals [11/30/22 1214]   Temperature Pulse Respirations Blood Pressure SpO2   98 1 °F (36 7 °C) 90 (!) 24 115/80 100 %      Temp Source Heart Rate Source Patient Position - Orthostatic VS BP Location FiO2 (%)   Tympanic Monitor Sitting Right arm --      Pain Score       7           Vitals:    11/30/22 1214   BP: 115/80   Pulse: 90   Patient Position - Orthostatic VS: Sitting         Visual Acuity      ED Medications  Medications - No data to display    Diagnostic Studies  Results Reviewed     Procedure Component Value Units Date/Time FLU/RSV/COVID - if FLU/RSV clinically relevant [[id number]]     Lab Status: No result Specimen: Nares from Nose                  No orders to display              Procedures  Procedures         ED Course                                             MDM  Number of Diagnoses or Management Options  Asthma exacerbation  Influenza-like illness  Diagnosis management comments: Patient appears comfortable no distress, likely flu-like illness with asthma exacerbation, will treat  Patient is informed to return to the emergency department for worsening of symptoms and was given proper education regarding their diagnosis and symptoms  Otherwise the patient is informed to follow up with their primary care doctor for re-evaluation  The patient verbalizes understanding and agrees with above assessment and plan  All questions were answered  Please Note: Fluency Direct voice recognition software may have been used in the creation of this document  Wrong words or sound a like substitutions may have occurred due to the inherent limitations of the voice software  Amount and/or Complexity of Data Reviewed  Clinical lab tests: ordered  Review and summarize past medical records: yes  Independent visualization of images, tracings, or specimens: yes        Disposition  Final diagnoses:   Influenza-like illness   Asthma exacerbation     Time reflects when diagnosis was documented in both MDM as applicable and the Disposition within this note     Time User Action Codes Description Comment    11/30/2022 12:45 PM Romina Young Add [J11 1] Influenza-like illness     11/30/2022 12:45 PM [address]  Asthma exacerbation       ED Disposition     ED Disposition   Discharge    Condition   Stable    Date/Time   Wed Nov 30, 2022 12:45 PM    Pedro Yin discharge to home/self care                 Follow-up Information     Follow up With Specialties Details Why Contact Info Additional Information [facility] Emergency Department Emergency Medicine Go to  If symptoms worsen such as high fevers, difficulty breathing, otherwise please follow up with your family doctor [address] [address] [address] [address] Emergency Department, [place], [place], [address]          Patient's Medications   Discharge Prescriptions    ALBUTEROL (2 5 MG/3 ML) 0 083 % NEBULIZER SOLUTION    Take 3 mL (2 5 mg total) by nebulization every 6 (six) hours as needed for wheezing or shortness of breath       Start Date: 11/30/2022End Date: --       Order Dose: 2 5 mg       Quantity: 75 mL    Refills: 0    ALBUTEROL (PROVENTIL HFA,VENTOLIN HFA) 90 MCG/ACT INHALER    Inhale 2 puffs every 6 (six) hours as needed for wheezing or shortness of breath       Start Date: 11/30/2022End Date: 12/30/2022       Order Dose: 2 puffs       Quantity: 18 g    Refills: 0    BENZONATATE (TESSALON PERLES) 100 MG CAPSULE    Take 1 capsule (100 mg total) by mouth 3 (three) times a day as needed for cough       Start Date: 11/30/2022End Date: --       Order Dose: 100 mg       Quantity: 20 capsule    Refills: 0    PREDNISONE 20 MG TABLET    Take 2 tablets (40 mg total) by mouth daily for 5 days       Start Date: 11/30/2022End Date: 12/5/2022       Order Dose: 40 mg       Quantity: 10 tablet    Refills: 0       Outpatient Discharge Orders   Nebulizer       PDMP Review     None          ED Provider  Electronically Signed by           Johny Connell PA-C  11/30/22 0192

## 2022-12-04 DIAGNOSIS — J45.40 MODERATE PERSISTENT ASTHMA WITHOUT COMPLICATION: ICD-10-CM

## 2022-12-05 RX ORDER — ALBUTEROL SULFATE 90 UG/1
2 AEROSOL, METERED RESPIRATORY (INHALATION) EVERY 6 HOURS PRN
Qty: 18 G | Refills: 0 | Status: SHIPPED | OUTPATIENT
Start: 2022-12-05

## 2022-12-05 RX ORDER — FLUTICASONE PROPIONATE AND SALMETEROL 250; 50 UG/1; UG/1
1 POWDER RESPIRATORY (INHALATION) 2 TIMES DAILY
Qty: 60 BLISTER | Refills: 5 | Status: SHIPPED | OUTPATIENT
Start: 2022-12-05

## 2023-01-30 DIAGNOSIS — J45.40 MODERATE PERSISTENT ASTHMA WITHOUT COMPLICATION: ICD-10-CM

## 2023-01-30 RX ORDER — ALBUTEROL SULFATE 90 UG/1
2 AEROSOL, METERED RESPIRATORY (INHALATION) EVERY 6 HOURS PRN
Qty: 18 G | Refills: 0 | Status: SHIPPED | OUTPATIENT
Start: 2023-01-30

## 2023-01-30 RX ORDER — MONTELUKAST SODIUM 10 MG/1
10 TABLET ORAL DAILY
Qty: 30 TABLET | Refills: 0 | Status: SHIPPED | OUTPATIENT
Start: 2023-01-30

## 2023-02-16 ENCOUNTER — HOSPITAL ENCOUNTER (OUTPATIENT)
Dept: RADIOLOGY | Facility: HOSPITAL | Age: 48
Discharge: HOME | End: 2023-02-16
Attending: FAMILY MEDICINE

## 2023-02-16 DIAGNOSIS — Z12.31 BREAST CANCER SCREENING BY MAMMOGRAM: ICD-10-CM

## 2023-02-20 ENCOUNTER — TELEPHONE (OUTPATIENT)
Dept: FAMILY MEDICINE CLINIC | Facility: CLINIC | Age: 48
End: 2023-02-20

## 2023-02-20 NOTE — TELEPHONE ENCOUNTER
[facility]  Request made to have this read  Please keep open to track     Derrick Paz MA

## 2023-02-20 NOTE — TELEPHONE ENCOUNTER
----- Message from [facility] sent at 2/20/2023 10:49 AM EST -----  Regarding: Mammogram   Contact: [phone]  Marc harris  I was curious if you received the results of my mammogram I had this past Thursday       Thank you   Elmo Yee

## 2023-02-21 ENCOUNTER — TELEPHONE (OUTPATIENT)
Dept: FAMILY MEDICINE CLINIC | Facility: CLINIC | Age: 48
End: 2023-02-21

## 2023-02-22 ENCOUNTER — TELEPHONE (OUTPATIENT)
Dept: FAMILY MEDICINE CLINIC | Facility: CLINIC | Age: 48
End: 2023-02-22

## 2023-02-22 DIAGNOSIS — F41.8 SITUATIONAL ANXIETY: Primary | ICD-10-CM

## 2023-02-22 RX ORDER — HYDROXYZINE HYDROCHLORIDE 25 MG/1
25 TABLET, FILM COATED ORAL EVERY 6 HOURS PRN
Qty: 30 TABLET | Refills: 1 | Status: SHIPPED | OUTPATIENT
Start: 2023-02-22

## 2023-02-22 NOTE — TELEPHONE ENCOUNTER
No record of xanax in past  Warning with lyrica  Called to offer hydroxyzine (and not xanax or bzd) but no answer  ----- Message from Janeth Plasencia sent at 2/22/2023 12:57 PM EST -----  Regarding: FW: Anxiety   Contact: [phone]    ----- Message -----  From: Connie Tomas  Sent: 2/22/2023  11:58 AM EST  To: THE [facility]  Subject: Anxiety                                          Dear Dr Ana Maria Ansari please have a prescription for Xanax  My anxiety is through the roof and causing me alot of mental and physical pain       Thank you   Juan Sam

## 2023-02-22 NOTE — TELEPHONE ENCOUNTER
sw pt  Aware she has no prior rx for xanax in her chart  She states she had in the distant past    [address]  Advised that using someone else's controlled rx is illegal and inappropriate    She states she is anxious over the mammogram findings though she has been through this before and has f/u test planned  Advised I will not give her xanax and it may also also interact with lyrica  She states she took herself off the lyrica  States anxiety is triggering her fibromyalgia  I advised I would offer only hydroxyzine and aware of sedation risk of use  She accepted     ----- Message from Maninder Pappas sent at 2/22/2023 12:57 PM EST -----  Regarding: FW: Anxiety   Contact: [phone]    ----- Message -----  From: Howie Good  Sent: 2/22/2023  11:58 AM EST  To: THE [facility]  Subject: Anxiety                                          Dear Dr Brayden Camacho please have a prescription for Xanax  My anxiety is through the roof and causing me alot of mental and physical pain       Thank you   Maria Teresa Santiago

## 2023-02-22 NOTE — TELEPHONE ENCOUNTER
S/w pt  Aware to call central scheduling as orders are in chart  She states she would like the lumps removed regardless of non-suspicious results and has had several removed in past and will contact me for surgical referrals in future  I told her ok     ----- Message from [facility] sent at 2/21/2023  3:25 PM EST -----  Regarding: FW: Mammogram   Contact: [phone]    ----- Message -----  From: Rick Salas  Sent: 2/21/2023   2:34 PM EST  To: THE [facility]  Subject: Mammogram                                        Hello   I have received my results    Should I call the hospital

## 2023-02-28 DIAGNOSIS — J45.40 MODERATE PERSISTENT ASTHMA WITHOUT COMPLICATION: ICD-10-CM

## 2023-02-28 RX ORDER — MONTELUKAST SODIUM 10 MG/1
10 TABLET ORAL DAILY
Qty: 30 TABLET | Refills: 0 | Status: SHIPPED | OUTPATIENT
Start: 2023-02-28

## 2023-03-15 ENCOUNTER — HOSPITAL ENCOUNTER (OUTPATIENT)
Dept: ULTRASOUND IMAGING | Facility: CLINIC | Age: 48
Discharge: HOME | End: 2023-03-15

## 2023-03-15 ENCOUNTER — HOSPITAL ENCOUNTER (OUTPATIENT)
Dept: MAMMOGRAPHY | Facility: CLINIC | Age: 48
Discharge: HOME | End: 2023-03-15

## 2023-03-15 VITALS — WEIGHT: 139 LBS | BODY MASS INDEX: 24.63 KG/M2 | HEIGHT: 63 IN

## 2023-03-15 DIAGNOSIS — R92.8 ABNORMAL SCREENING MAMMOGRAM: ICD-10-CM

## 2023-03-16 DIAGNOSIS — R92.8 ABNORMAL MAMMOGRAM: Primary | ICD-10-CM

## 2023-03-24 DIAGNOSIS — J45.40 MODERATE PERSISTENT ASTHMA WITHOUT COMPLICATION: ICD-10-CM

## 2023-03-24 DIAGNOSIS — F41.8 SITUATIONAL ANXIETY: ICD-10-CM

## 2023-03-25 RX ORDER — ALBUTEROL SULFATE 90 UG/1
2 AEROSOL, METERED RESPIRATORY (INHALATION) EVERY 6 HOURS PRN
Qty: 18 G | Refills: 0 | Status: SHIPPED | OUTPATIENT
Start: 2023-03-25

## 2023-03-25 RX ORDER — FLUTICASONE PROPIONATE AND SALMETEROL 250; 50 UG/1; UG/1
1 POWDER RESPIRATORY (INHALATION) 2 TIMES DAILY
Qty: 60 BLISTER | Refills: 0 | Status: SHIPPED | OUTPATIENT
Start: 2023-03-25

## 2023-03-25 RX ORDER — MONTELUKAST SODIUM 10 MG/1
10 TABLET ORAL DAILY
Qty: 30 TABLET | Refills: 0 | Status: SHIPPED | OUTPATIENT
Start: 2023-03-25

## 2023-03-25 RX ORDER — HYDROXYZINE HYDROCHLORIDE 25 MG/1
25 TABLET, FILM COATED ORAL EVERY 6 HOURS PRN
Qty: 30 TABLET | Refills: 0 | Status: SHIPPED | OUTPATIENT
Start: 2023-03-25

## 2023-03-29 ENCOUNTER — OFFICE VISIT (OUTPATIENT)
Dept: FAMILY MEDICINE CLINIC | Facility: CLINIC | Age: 48
End: 2023-03-29

## 2023-03-29 VITALS
HEART RATE: 93 BPM | SYSTOLIC BLOOD PRESSURE: 110 MMHG | DIASTOLIC BLOOD PRESSURE: 74 MMHG | HEIGHT: 63 IN | OXYGEN SATURATION: 98 % | BODY MASS INDEX: 25.87 KG/M2 | TEMPERATURE: 97.5 F | RESPIRATION RATE: 16 BRPM | WEIGHT: 146 LBS

## 2023-03-29 DIAGNOSIS — M79.7 FIBROMYALGIA: ICD-10-CM

## 2023-03-29 DIAGNOSIS — L30.9 ECZEMA, UNSPECIFIED TYPE: ICD-10-CM

## 2023-03-29 DIAGNOSIS — N63.20 MASS OF LEFT BREAST, UNSPECIFIED QUADRANT: ICD-10-CM

## 2023-03-29 DIAGNOSIS — F41.1 GENERALIZED ANXIETY DISORDER: Primary | ICD-10-CM

## 2023-03-29 PROBLEM — J20.9 ACUTE BRONCHITIS: Status: RESOLVED | Noted: 2022-08-15 | Resolved: 2023-03-29

## 2023-03-29 PROBLEM — R21 RASH: Status: RESOLVED | Noted: 2022-08-15 | Resolved: 2023-03-29

## 2023-03-29 PROBLEM — Z12.31 BREAST CANCER SCREENING BY MAMMOGRAM: Status: RESOLVED | Noted: 2021-09-24 | Resolved: 2023-03-29

## 2023-03-29 RX ORDER — CLOBETASOL PROPIONATE 0.5 MG/G
OINTMENT TOPICAL 2 TIMES DAILY
Qty: 30 G | Refills: 0 | Status: SHIPPED | OUTPATIENT
Start: 2023-03-29

## 2023-03-29 RX ORDER — DULOXETIN HYDROCHLORIDE 30 MG/1
30 CAPSULE, DELAYED RELEASE ORAL DAILY
Qty: 30 CAPSULE | Refills: 1 | Status: SHIPPED | OUTPATIENT
Start: 2023-03-29

## 2023-03-29 RX ORDER — FLUTICASONE PROPIONATE 250 UG/1
1 POWDER, METERED RESPIRATORY (INHALATION) 2 TIMES DAILY
COMMUNITY
Start: 2023-03-13

## 2023-03-29 NOTE — ASSESSMENT & PLAN NOTE
She does not recall which medications she was previously taking  Discussed starting Cymbalta to manage her anxiety as well as fibromyalgia symptoms  Discussed potential side effects    RTO 6 week for med check

## 2023-03-29 NOTE — PROGRESS NOTES
Assessment/Plan:    1  Generalized anxiety disorder  Assessment & Plan:  She does not recall which medications she was previously taking  Discussed starting Cymbalta to manage her anxiety as well as fibromyalgia symptoms  Discussed potential side effects  RTO 6 week for med check    Orders:  -     DULoxetine (CYMBALTA) 30 mg delayed release capsule; Take 1 capsule (30 mg total) by mouth daily    2  Mass of left breast, unspecified quadrant  -     Ambulatory Referral to General Surgery; Future    3  Fibromyalgia  Assessment & Plan:  Was previously on Lyrica and MTX for RA per rheumatologist, Dr Phillip Harvey  She stopped these on her own, does not wish to continue    Orders:  -     DULoxetine (CYMBALTA) 30 mg delayed release capsule; Take 1 capsule (30 mg total) by mouth daily    4  Eczema, unspecified type  -     clobetasol (TEMOVATE) 0 05 % ointment; Apply topically 2 (two) times a day          Depression Screening Follow-up Plan: Patient's depression screening was positive with a PHQ-2 score of 6  Their PHQ-9 score was 15  Patient assessed for underlying major depression  They have no active suicidal ideations  Brief counseling provided and recommend additional follow-up/re-evaluation next office visit  There are no Patient Instructions on file for this visit  Return in about 6 weeks (around 5/10/2023)  Subjective:      Patient ID: Carissa Mayberry is a 49 y o  female  Chief Complaint   Patient presents with   • Anxiety     wmcma       Here today with anxiety  This was exacerbated after recent mammogram findings  Hx of fibroadenoma, would like a referral to have current one removed d/t anxiety  Has had longstanding anxiety  She is adopted, biological mother has been staying with her  Has a lot of fibromyalgia and RA  Was previously on MTX and Lyrica which she stopped about 2 months ago      Smokes marijuana to manage her anxiety  Reports she took medication for her anxiety many years ago, tried multiple medications, but she felt like a zombie  Has seen counselors in the past, had a hard time finding someone she liked  Had a splinter in her left 3rd finger, not sure if she got it out  Now has fluid filled bumps on her finger that are painful, not itching  The following portions of the patient's history were reviewed and updated as appropriate: allergies, current medications, past family history, past medical history, past social history, past surgical history and problem list     Review of Systems   Constitutional: Negative  Respiratory: Negative  Cardiovascular: Negative  Musculoskeletal: Positive for arthralgias  Psychiatric/Behavioral:        See HPI         Current Outpatient Medications   Medication Sig Dispense Refill   • albuterol (2 5 mg/3 mL) 0 083 % nebulizer solution Take 3 mL (2 5 mg total) by nebulization every 6 (six) hours as needed for wheezing or shortness of breath 75 mL 0   • albuterol (PROVENTIL HFA,VENTOLIN HFA) 90 mcg/act inhaler Inhale 2 puffs every 6 (six) hours as needed for shortness of breath Rinse/spit after use 18 g 0   • Biotin 10 MG CAPS Take by mouth     • clobetasol (TEMOVATE) 0 05 % ointment Apply topically 2 (two) times a day 30 g 0   • DULoxetine (CYMBALTA) 30 mg delayed release capsule Take 1 capsule (30 mg total) by mouth daily 30 capsule 1   • Flovent Diskus 250 MCG/ACT diskus inhaler Inhale 1 puff 2 (two) times a day     • Fluticasone-Salmeterol (Advair) 250-50 mcg/dose inhaler Inhale 1 puff 2 (two) times a day Rinse mouth after use   60 blister 0   • hydrOXYzine HCL (ATARAX) 25 mg tablet Take 1 tablet (25 mg total) by mouth every 6 (six) hours as needed for anxiety 1-2 po q6hrs prn anxiety/insomnia 30 tablet 0   • montelukast (SINGULAIR) 10 mg tablet Take 1 tablet (10 mg total) by mouth daily 30 tablet 0   • multivitamin (THERAGRAN) TABS Take 1 tablet by mouth daily     • naproxen (NAPROSYN) 500 mg tablet take 1 tablet by mouth twice a day if "needed for moderate pain 180 tablet 0     No current facility-administered medications for this visit  Objective:    /74 (BP Location: Left arm, Patient Position: Sitting, Cuff Size: Standard)   Pulse 93   Temp 97 5 °F (36 4 °C) (Temporal)   Resp 16   Ht 5' 2 75\" (1 594 m)   Wt 66 2 kg (146 lb)   LMP 03/03/2023   SpO2 98%   BMI 26 07 kg/m²        Physical Exam  Vitals and nursing note reviewed  Constitutional:       Appearance: Normal appearance  She is well-developed  Cardiovascular:      Rate and Rhythm: Normal rate and regular rhythm  Pulses: Normal pulses  Heart sounds: Normal heart sounds  No murmur heard  Pulmonary:      Effort: Pulmonary effort is normal       Breath sounds: Normal breath sounds  Skin:     General: Skin is warm and dry  Comments: Left 3rd finger with raised fluid filled lesions   Neurological:      Mental Status: She is alert     Psychiatric:         Mood and Affect: Mood normal          Behavior: Behavior normal                 NICOLA Chapa  "

## 2023-03-29 NOTE — ASSESSMENT & PLAN NOTE
Was previously on Lyrica and MTX for RA per rheumatologist, Dr Shade Rodriguez    She stopped these on her own, does not wish to continue

## 2023-04-22 DIAGNOSIS — J45.40 MODERATE PERSISTENT ASTHMA WITHOUT COMPLICATION: ICD-10-CM

## 2023-04-23 ENCOUNTER — TELEPHONE (OUTPATIENT)
Dept: OTHER | Facility: OTHER | Age: 48
End: 2023-04-23

## 2023-04-23 NOTE — TELEPHONE ENCOUNTER
Patient is calling regarding cancelling an appointment      Date/Time:04-27-23 @ 0800    Patient was rescheduled: YES [] NO [x]    Patient requesting call back to reschedule: YES [] NO [x]

## 2023-04-24 ENCOUNTER — TELEPHONE (OUTPATIENT)
Dept: SURGERY | Facility: CLINIC | Age: 48
End: 2023-04-24

## 2023-04-24 RX ORDER — MONTELUKAST SODIUM 10 MG/1
TABLET ORAL
Qty: 30 TABLET | Refills: 0 | Status: SHIPPED | OUTPATIENT
Start: 2023-04-24

## 2023-04-24 NOTE — TELEPHONE ENCOUNTER
Called and spoke with patient  Patient left message canceling appointment for 4/27/2023  Patient states she will call office to reschedule

## 2023-05-04 ENCOUNTER — RA CDI HCC (OUTPATIENT)
Dept: OTHER | Facility: HOSPITAL | Age: 48
End: 2023-05-04

## 2023-05-04 NOTE — PROGRESS NOTES
[facility]  coding opportunities          Chart Reviewed number of suggestions sent to Provider: 1  [phone]     Patients Insurance        Commercial Insurance: Aman Govea

## 2023-05-14 DIAGNOSIS — J45.40 MODERATE PERSISTENT ASTHMA WITHOUT COMPLICATION: ICD-10-CM

## 2023-05-15 RX ORDER — FLUTICASONE PROPIONATE AND SALMETEROL 50; 250 UG/1; UG/1
POWDER RESPIRATORY (INHALATION)
Qty: 60 BLISTER | Refills: 0 | Status: SHIPPED | OUTPATIENT
Start: 2023-05-15 | End: 2023-05-21 | Stop reason: SDUPTHER

## 2023-05-21 DIAGNOSIS — J45.40 MODERATE PERSISTENT ASTHMA WITHOUT COMPLICATION: ICD-10-CM

## 2023-05-21 DIAGNOSIS — M79.7 FIBROMYALGIA: ICD-10-CM

## 2023-05-21 DIAGNOSIS — L30.9 ECZEMA, UNSPECIFIED TYPE: ICD-10-CM

## 2023-05-21 DIAGNOSIS — F41.1 GENERALIZED ANXIETY DISORDER: ICD-10-CM

## 2023-05-22 RX ORDER — FLUTICASONE PROPIONATE AND SALMETEROL 250; 50 UG/1; UG/1
1 POWDER RESPIRATORY (INHALATION) 2 TIMES DAILY
Qty: 60 BLISTER | Refills: 0 | Status: SHIPPED | OUTPATIENT
Start: 2023-05-22

## 2023-05-22 RX ORDER — MONTELUKAST SODIUM 10 MG/1
10 TABLET ORAL DAILY
Qty: 30 TABLET | Refills: 0 | Status: SHIPPED | OUTPATIENT
Start: 2023-05-22

## 2023-05-22 RX ORDER — CLOBETASOL PROPIONATE 0.5 MG/G
OINTMENT TOPICAL 2 TIMES DAILY
Qty: 30 G | Refills: 0 | Status: SHIPPED | OUTPATIENT
Start: 2023-05-22

## 2023-05-22 RX ORDER — DULOXETIN HYDROCHLORIDE 30 MG/1
30 CAPSULE, DELAYED RELEASE ORAL DAILY
Qty: 30 CAPSULE | Refills: 0 | Status: SHIPPED | OUTPATIENT
Start: 2023-05-22

## 2023-05-24 ENCOUNTER — TELEPHONE (OUTPATIENT)
Dept: FAMILY MEDICINE CLINIC | Facility: CLINIC | Age: 48
End: 2023-05-24

## 2023-05-24 NOTE — TELEPHONE ENCOUNTER
Pt left message on machine that three fingers on hand have been numb for a week  Would like to know how to proceed, does she need an appt?

## 2023-05-26 ENCOUNTER — OFFICE VISIT (OUTPATIENT)
Dept: FAMILY MEDICINE CLINIC | Facility: CLINIC | Age: 48
End: 2023-05-26

## 2023-05-26 VITALS
SYSTOLIC BLOOD PRESSURE: 114 MMHG | DIASTOLIC BLOOD PRESSURE: 80 MMHG | TEMPERATURE: 98.3 F | WEIGHT: 150.2 LBS | HEIGHT: 63 IN | HEART RATE: 72 BPM | RESPIRATION RATE: 17 BRPM | BODY MASS INDEX: 26.61 KG/M2

## 2023-05-26 DIAGNOSIS — G56.22 CUBITAL TUNNEL SYNDROME ON LEFT: Primary | ICD-10-CM

## 2023-05-26 NOTE — PATIENT INSTRUCTIONS
Cubital Tunnel Syndrome   AMBULATORY CARE:   Cubital tunnel syndrome  is a condition that causes pressure to build on the ulnar nerve in your elbow  The ulnar nerve controls muscles and feeling in the hand  Cubital tunnel syndrome may be caused by direct pressure, stretching, or decreased blood flow to the ulnar nerve  Common signs and symptoms include the following:   Numbness and tingling in your arm or hand, usually in the ring and little fingers    Pain in your elbow that extends into your forearm and hand    Weakness in your hand and fingers    Not being able to straighten your fingers, usually the ring and little fingers    Seek care immediately if:  You suddenly lose feeling in your hand or fingers  You cannot move your ring or little finger  Call your doctor or orthopedist if:   Your symptoms get worse  Your hand and fingers are so weak that you cannot grab, squeeze, or lift items  You have questions or concerns about your condition or care  Treatment  may not be needed  If your symptoms continue, you may need any of the following:  NSAIDs  help decrease swelling and pain or fever  This medicine is available with or without a doctor's order  NSAIDs can cause stomach bleeding or kidney problems in certain people  If you take blood thinner medicine, always ask your healthcare provider if NSAIDs are safe for you  Always read the medicine label and follow directions  A steroid injection  helps decrease pain and swelling  Surgery  may be needed if your symptoms do not get better within 3 months  Surgery will take pressure off your ulnar nerve  Your surgeon may move your nerve to a different area to stop it from being stretched or pinched  He or she may remove part of your bone if it is pressing on your nerve  Manage your symptoms:   Do not put pressure on your elbow  Certain positions put pressure on the ulnar nerve in your elbow  Do not lean on your elbow   Do not sleep with your arm overhead and elbow bent  Go to sleep with your arm straight and by your side  Apply ice  Ice helps decrease swelling and pain and prevents tissue damage  Use an ice pack or put crushed ice in a plastic bag  Cover the bag with a towel before you place it on your skin  Apply ice for 15 to 20 minutes every hour, or as directed  Rest your arm  Avoid activities that cause your symptoms to allow your nerve to heal     Go to physical therapy as directed  A physical therapist can show you exercises to help improve movement and strength  Physical therapy can also help decrease pain and loss of function  Use an elbow splint or brace  The brace or splint helps decrease arm movement and keep pressure off your ulnar nerve  Your healthcare provider will tell you when and how long to wear it each day  You may need to wear it at night  You may also need elbow pads to protect your elbow  Follow up with your doctor or orthopedist as directed:  Write down your questions so you remember to ask them during your visits  © Copyright [employer] 2022 Information is for End User's use only and may not be sold, redistributed or otherwise used for commercial purposes  The above information is an  only  It is not intended as medical advice for individual conditions or treatments  Talk to your doctor, nurse or pharmacist before following any medical regimen to see if it is safe and effective for you

## 2023-05-26 NOTE — PROGRESS NOTES
Assessment/Plan:    Reviewed symptomatic treatment  Referral provided for orthopedics if no improvement    1  Cubital tunnel syndrome on left  -     Ambulatory Referral to Orthopedic Surgery; Future            Patient Instructions   Cubital Tunnel Syndrome   AMBULATORY CARE:   Cubital tunnel syndrome  is a condition that causes pressure to build on the ulnar nerve in your elbow  The ulnar nerve controls muscles and feeling in the hand  Cubital tunnel syndrome may be caused by direct pressure, stretching, or decreased blood flow to the ulnar nerve  Common signs and symptoms include the following:   Numbness and tingling in your arm or hand, usually in the ring and little fingers    Pain in your elbow that extends into your forearm and hand    Weakness in your hand and fingers    Not being able to straighten your fingers, usually the ring and little fingers    Seek care immediately if:  You suddenly lose feeling in your hand or fingers  You cannot move your ring or little finger  Call your doctor or orthopedist if:   Your symptoms get worse  Your hand and fingers are so weak that you cannot grab, squeeze, or lift items  You have questions or concerns about your condition or care  Treatment  may not be needed  If your symptoms continue, you may need any of the following:  NSAIDs  help decrease swelling and pain or fever  This medicine is available with or without a doctor's order  NSAIDs can cause stomach bleeding or kidney problems in certain people  If you take blood thinner medicine, always ask your healthcare provider if NSAIDs are safe for you  Always read the medicine label and follow directions  A steroid injection  helps decrease pain and swelling  Surgery  may be needed if your symptoms do not get better within 3 months  Surgery will take pressure off your ulnar nerve  Your surgeon may move your nerve to a different area to stop it from being stretched or pinched   He or she may remove part of your bone if it is pressing on your nerve  Manage your symptoms:   Do not put pressure on your elbow  Certain positions put pressure on the ulnar nerve in your elbow  Do not lean on your elbow  Do not sleep with your arm overhead and elbow bent  Go to sleep with your arm straight and by your side  Apply ice  Ice helps decrease swelling and pain and prevents tissue damage  Use an ice pack or put crushed ice in a plastic bag  Cover the bag with a towel before you place it on your skin  Apply ice for 15 to 20 minutes every hour, or as directed  Rest your arm  Avoid activities that cause your symptoms to allow your nerve to heal     Go to physical therapy as directed  A physical therapist can show you exercises to help improve movement and strength  Physical therapy can also help decrease pain and loss of function  Use an elbow splint or brace  The brace or splint helps decrease arm movement and keep pressure off your ulnar nerve  Your healthcare provider will tell you when and how long to wear it each day  You may need to wear it at night  You may also need elbow pads to protect your elbow  Follow up with your doctor or orthopedist as directed:  Write down your questions so you remember to ask them during your visits  © Copyright [facility] 2022 Information is for End User's use only and may not be sold, redistributed or otherwise used for commercial purposes  The above information is an  only  It is not intended as medical advice for individual conditions or treatments  Talk to your doctor, nurse or pharmacist before following any medical regimen to see if it is safe and effective for you  Return if symptoms worsen or fail to improve  Subjective:      Patient ID: Yamile Perez is a 49 y o  female  Chief Complaint   Patient presents with   • Numbness     Numbness in left hand 3 fingers  Traveling up arm now  Symptoms started 1 week ago nm  lpn       For the past week, she has been experiencing numbness in her 3-5 fingers on the left hand  Hand feels a little weak  No known injury  No wrist or elbow pain  Numbness is extending into outer aspect of her forearm  Not using anything OTC      The following portions of the patient's history were reviewed and updated as appropriate: allergies, current medications, past family history, past medical history, past social history, past surgical history and problem list     Review of Systems   Constitutional: Negative  Respiratory: Negative  Musculoskeletal:        See HPI   Skin: Negative for color change  Neurological: Positive for numbness  Negative for weakness           Current Outpatient Medications   Medication Sig Dispense Refill   • albuterol (2 5 mg/3 mL) 0 083 % nebulizer solution Take 3 mL (2 5 mg total) by nebulization every 6 (six) hours as needed for wheezing or shortness of breath 75 mL 0   • albuterol (PROVENTIL HFA,VENTOLIN HFA) 90 mcg/act inhaler Inhale 2 puffs every 6 (six) hours as needed for shortness of breath Rinse/spit after use 18 g 0   • Biotin 10 MG CAPS Take by mouth     • clobetasol (TEMOVATE) 0 05 % ointment Apply topically 2 (two) times a day (Patient taking differently: Apply topically 2 (two) times a day As needed) 30 g 0   • DULoxetine (CYMBALTA) 30 mg delayed release capsule Take 1 capsule (30 mg total) by mouth daily 30 capsule 0   • Fluticasone-Salmeterol (Advair Diskus) 250-50 mcg/dose inhaler Inhale 1 puff 2 (two) times a day Rinse mouth after use 60 blister 0   • hydrOXYzine HCL (ATARAX) 25 mg tablet Take 1 tablet (25 mg total) by mouth every 6 (six) hours as needed for anxiety 1-2 po q6hrs prn anxiety/insomnia 30 tablet 0   • montelukast (SINGULAIR) 10 mg tablet Take 1 tablet (10 mg total) by mouth daily 30 tablet 0   • multivitamin (THERAGRAN) TABS Take 1 tablet by mouth daily     • naproxen (NAPROSYN) 500 mg tablet take 1 tablet by mouth twice a day if needed for moderate pain 180 "tablet 0     No current facility-administered medications for this visit  Objective:    /80   Pulse 72   Temp 98 3 °F (36 8 °C)   Resp 17   Ht 5' 2 75\" (1 594 m)   Wt 68 1 kg (150 lb 3 2 oz)   LMP 04/24/2023 (Approximate)   BMI 26 82 kg/m²        Physical Exam  Vitals and nursing note reviewed  Constitutional:       Appearance: She is well-developed  Cardiovascular:      Rate and Rhythm: Normal rate and regular rhythm  Heart sounds: Normal heart sounds  No murmur heard  Pulmonary:      Effort: Pulmonary effort is normal       Breath sounds: Normal breath sounds  Musculoskeletal:      Left elbow: Normal       Left forearm: Normal       Left wrist: Normal       Comments: Decreased sensation 3rd-5th fingers left hand, lateral aspect of hand and forearm   Skin:     General: Skin is warm and dry  Neurological:      Mental Status: She is alert     Psychiatric:         Mood and Affect: Mood normal          Behavior: Behavior normal                 NICOLA Wyman  "

## 2023-06-05 ENCOUNTER — TELEPHONE (OUTPATIENT)
Dept: OBGYN CLINIC | Facility: HOSPITAL | Age: 48
End: 2023-06-05

## 2023-06-05 NOTE — TELEPHONE ENCOUNTER
Caller: Patient    Doctor: Dr Valentine Lechuga    Reason for call: Patient called stating that she bought a brace and was suppose to wear it for a week and then call Dr Valentine Lechuga  She is reporting that the brace didn't really help at all and is looking for advisement as to what she should do next    Patient asking to speak to clinical team      Call back#: [phone]

## 2023-06-09 ENCOUNTER — OFFICE VISIT (OUTPATIENT)
Dept: OBGYN CLINIC | Facility: CLINIC | Age: 48
End: 2023-06-09
Payer: COMMERCIAL

## 2023-06-09 VITALS — HEART RATE: 78 BPM | SYSTOLIC BLOOD PRESSURE: 133 MMHG | DIASTOLIC BLOOD PRESSURE: 88 MMHG

## 2023-06-09 DIAGNOSIS — G56.22 CUBITAL TUNNEL SYNDROME ON LEFT: ICD-10-CM

## 2023-06-09 PROCEDURE — 99203 OFFICE O/P NEW LOW 30 MIN: CPT | Performed by: PHYSICIAN ASSISTANT

## 2023-06-09 NOTE — PROGRESS NOTES
Assessment/Plan:  1  Cubital tunnel syndrome on left  Ambulatory Referral to Orthopedic Surgery    US MSK limited        The patient does appear to have a rather acute onset of cubital tunnel syndrome about the left upper extremity  She may have a component of carpal tunnel syndrome to as she notes more recently she is started with some paresthesias about the middle finger as well  I am ordering ultrasounds to further evaluate for cubital and carpal tunnel syndromes  I did advise trying to avoid flexing the elbow for prolonged periods of times, as this can certainly exacerbate cubital tunnel symptoms  She will follow-up after her ultrasounds to discuss the results and how to proceed  We did also discuss possible EMG, however the patient notes she would have significant anxiety with this procedure and does not think she would tolerate it  Subjective:   Everett Estes is a 49 y o  female who presents today for evaluation of her left upper extremity  She notes about 3 weeks ago she started with some tingling into the little finger and ring finger, and more recently started with some paresthesias into the middle finger as well  She does have a history of rheumatoid arthritis and fibromyalgia  She denies any injury prior to the onset of her symptoms  She has tried a wrist brace with no significant improvement in her symptoms  The patient does not have any history of neck issues  Review of Systems   Constitutional: Negative  Negative for chills and fever  HENT: Negative  Negative for ear pain and sore throat  Eyes: Negative  Negative for pain and redness  Respiratory: Negative  Negative for shortness of breath and wheezing  Cardiovascular: Negative for chest pain and palpitations  Gastrointestinal: Negative  Negative for abdominal pain and blood in stool  Endocrine: Negative  Negative for polydipsia and polyuria  Genitourinary: Negative  Negative for difficulty urinating and dysuria  Musculoskeletal:        As noted in HPI   Skin: Negative  Negative for pallor and rash  Neurological: Positive for numbness  Negative for dizziness  Hematological: Negative  Negative for adenopathy  Does not bruise/bleed easily  Psychiatric/Behavioral: Negative  Negative for confusion and suicidal ideas           Past Medical History:   Diagnosis Date   • Asthma    • Lupus ([facility], [place] [address] ) 2021       Past Surgical History:   Procedure Laterality Date   • BREAST BIOPSY Bilateral     benign   • BREAST CYST EXCISION Right 2019    benign   • BREAST SURGERY     • CATARACT EXTRACTION Bilateral    •  SECTION     • RECTAL SURGERY         Family History   Adopted: Yes   Family history unknown: Yes       Social History     Occupational History   • Not on file   Tobacco Use   • Smoking status: Former     Types: Cigarettes     Start date: 2011   • Smokeless tobacco: Never   Vaping Use   • Vaping Use: Never used   Substance and Sexual Activity   • Alcohol use: Not Currently     Comment: socially   • Drug use: Yes     Types: Marijuana   • Sexual activity: Yes         Current Outpatient Medications:   •  albuterol (2 5 mg/3 mL) 0 083 % nebulizer solution, Take 3 mL (2 5 mg total) by nebulization every 6 (six) hours as needed for wheezing or shortness of breath, Disp: 75 mL, Rfl: 0  •  albuterol (PROVENTIL HFA,VENTOLIN HFA) 90 mcg/act inhaler, Inhale 2 puffs every 6 (six) hours as needed for shortness of breath Rinse/spit after use, Disp: 18 g, Rfl: 0  •  Biotin 10 MG CAPS, Take by mouth, Disp: , Rfl:   •  clobetasol (TEMOVATE) 0 05 % ointment, Apply topically 2 (two) times a day (Patient taking differently: Apply topically 2 (two) times a day As needed), Disp: 30 g, Rfl: 0  •  DULoxetine (CYMBALTA) 30 mg delayed release capsule, Take 1 capsule (30 mg total) by mouth daily, Disp: 30 capsule, Rfl: 0  •  Fluticasone-Salmeterol (Advair Diskus) 250-50 mcg/dose inhaler, Inhale 1 puff 2 (two) times a day Rinse mouth after use, Disp: 60 blister, Rfl: 0  •  hydrOXYzine HCL (ATARAX) 25 mg tablet, Take 1 tablet (25 mg total) by mouth every 6 (six) hours as needed for anxiety 1-2 po q6hrs prn anxiety/insomnia, Disp: 30 tablet, Rfl: 0  •  montelukast (SINGULAIR) 10 mg tablet, Take 1 tablet (10 mg total) by mouth daily, Disp: 30 tablet, Rfl: 0  •  multivitamin (THERAGRAN) TABS, Take 1 tablet by mouth daily, Disp: , Rfl:   •  naproxen (NAPROSYN) 500 mg tablet, Take 1 tablet (500 mg total) by mouth 2 (two) times a day with meals, Disp: 60 tablet, Rfl: 3    Allergies   Allergen Reactions   • Penicillins Shortness Of Breath   • Penicillins Hives and Shortness Of Breath   • Other      DOG   • Pollen Extract        Objective:  Vitals:    06/09/23 1148   BP: 133/88   Pulse: 78            Right Hand Exam     Tenderness   The patient is experiencing no tenderness  Range of Motion   The patient has normal right wrist ROM  Tests   Phalen’s Sign: negative  Tinel's sign (median nerve): negative    Other   Erythema: absent  Sensation: normal  Pulse: present    Comments:  5/5 strength first dorsal interosseous and APB  No thenar atrophy      Right Elbow Exam     Tenderness   Right elbow tenderness location: cubital tunnel  Range of Motion   Extension: 0   Flexion: 140   Pronation: normal   Supination: normal     Tests   Tinel's sign (cubital tunnel): positive    Other   Erythema: absent  Right elbow sensation: Parethesias Left little and ring fingers  Pulse: present          Tests     Right Wrist/Hand   Negative Phalen's sign and Tinel's sign (medial nerve)  Physical Exam  Constitutional:       General: She is not in acute distress  Appearance: She is well-developed  HENT:      Head: Normocephalic and atraumatic  Eyes:      General: No scleral icterus  Conjunctiva/sclera: Conjunctivae normal    Neck:      Vascular: No JVD  Cardiovascular:      Rate and Rhythm: Normal rate     Pulmonary:      Effort: Pulmonary effort is normal  No respiratory distress  Skin:     General: Skin is warm  Neurological:      Mental Status: She is alert and oriented to person, place, and time  Coordination: Coordination normal              This document was created using speech voice recognition software  Grammatical errors, random word insertions, pronoun errors, and incomplete sentences are an occasional consequence of this system due to software limitations, ambient noise, and hardware issues  Any formal questions or concerns about content, text, or information contained within the body of this dictation should be directly addressed to the provider for clarification

## 2023-06-13 ENCOUNTER — TELEPHONE (OUTPATIENT)
Dept: FAMILY MEDICINE CLINIC | Facility: CLINIC | Age: 48
End: 2023-06-13

## 2023-06-13 NOTE — TELEPHONE ENCOUNTER
Patient called office and states that her carpal tunnel is really hurting  She wants to know what she can take for the pain      Please call patient and let her know

## 2023-06-14 NOTE — TELEPHONE ENCOUNTER
All she can take is the Naproxen or we could try her back on Gabapentin, which she has taken before  She is also seeing kwaku Santiago

## 2023-06-14 NOTE — TELEPHONE ENCOUNTER
Called and spoke w patient  She stated she was on the Gabapentin prescribed from 2021, 100mg 3x a day  She stated she does not want a higher dose  She also asked about other facilities besides for [facility] where she could get her US done  I told her she should call insurance to find out which facilities she can go to that will be within her insurance plan    She will call us back with any updates  Radha Sensing, CMA

## 2023-06-14 NOTE — TELEPHONE ENCOUNTER
Patient informed, she stated she does take both of those medications and they are not helping  Her ring finger on her left hand got really bad yesterday feeling weird when she  her steering wheel and while she is showering  Her ultrasound is scheduled for August, she can not get into ortho until the ultrasound is completed  She is asking if there is anything in addition to Gabapentin and naproxen?

## 2023-06-14 NOTE — TELEPHONE ENCOUNTER
She can call to see if an outside facility can schedule her sooner for the ultrasound  There isn't anything else I can prescribe for this  I don't see Gabapentin on her medication list, hasn't been prescribed since last year  Please find out how much she is taking, the dose can likely be increased   Domenico Jorge

## 2023-06-27 DIAGNOSIS — M25.50 DIFFUSE ARTHRALGIA: ICD-10-CM

## 2023-06-27 DIAGNOSIS — J45.40 MODERATE PERSISTENT ASTHMA WITHOUT COMPLICATION: ICD-10-CM

## 2023-06-27 DIAGNOSIS — M79.7 FIBROMYALGIA: ICD-10-CM

## 2023-06-27 DIAGNOSIS — F41.1 GENERALIZED ANXIETY DISORDER: ICD-10-CM

## 2023-06-27 RX ORDER — DULOXETIN HYDROCHLORIDE 30 MG/1
30 CAPSULE, DELAYED RELEASE ORAL DAILY
Qty: 30 CAPSULE | Refills: 0 | Status: SHIPPED | OUTPATIENT
Start: 2023-06-27

## 2023-06-27 RX ORDER — FLUTICASONE PROPIONATE AND SALMETEROL 250; 50 UG/1; UG/1
1 POWDER RESPIRATORY (INHALATION) 2 TIMES DAILY
Qty: 60 BLISTER | Refills: 0 | Status: SHIPPED | OUTPATIENT
Start: 2023-06-27

## 2023-06-27 RX ORDER — MONTELUKAST SODIUM 10 MG/1
10 TABLET ORAL DAILY
Qty: 30 TABLET | Refills: 0 | Status: SHIPPED | OUTPATIENT
Start: 2023-06-27

## 2023-06-27 RX ORDER — NAPROXEN 500 MG/1
500 TABLET ORAL 2 TIMES DAILY WITH MEALS
Qty: 60 TABLET | Refills: 0 | Status: SHIPPED | OUTPATIENT
Start: 2023-06-27

## 2023-06-27 RX ORDER — ALBUTEROL SULFATE 90 UG/1
2 AEROSOL, METERED RESPIRATORY (INHALATION) EVERY 6 HOURS PRN
Qty: 18 G | Refills: 0 | Status: SHIPPED | OUTPATIENT
Start: 2023-06-27

## 2023-07-24 DIAGNOSIS — F41.1 GENERALIZED ANXIETY DISORDER: ICD-10-CM

## 2023-07-24 DIAGNOSIS — M79.7 FIBROMYALGIA: ICD-10-CM

## 2023-07-24 RX ORDER — DULOXETIN HYDROCHLORIDE 30 MG/1
30 CAPSULE, DELAYED RELEASE ORAL DAILY
Qty: 30 CAPSULE | Refills: 1 | Status: SHIPPED | OUTPATIENT
Start: 2023-07-24

## 2023-07-27 DIAGNOSIS — J45.40 MODERATE PERSISTENT ASTHMA WITHOUT COMPLICATION: ICD-10-CM

## 2023-07-27 RX ORDER — MONTELUKAST SODIUM 10 MG/1
10 TABLET ORAL DAILY
Qty: 30 TABLET | Refills: 1 | Status: SHIPPED | OUTPATIENT
Start: 2023-07-27

## 2023-07-28 DIAGNOSIS — J45.40 MODERATE PERSISTENT ASTHMA WITHOUT COMPLICATION: ICD-10-CM

## 2023-07-28 RX ORDER — FLUTICASONE PROPIONATE AND SALMETEROL 250; 50 UG/1; UG/1
1 POWDER RESPIRATORY (INHALATION) 2 TIMES DAILY
Qty: 60 BLISTER | Refills: 0 | Status: SHIPPED | OUTPATIENT
Start: 2023-07-28

## 2023-08-25 DIAGNOSIS — J45.40 MODERATE PERSISTENT ASTHMA WITHOUT COMPLICATION: ICD-10-CM

## 2023-08-25 DIAGNOSIS — M25.50 DIFFUSE ARTHRALGIA: ICD-10-CM

## 2023-08-25 RX ORDER — ALBUTEROL SULFATE 90 UG/1
2 AEROSOL, METERED RESPIRATORY (INHALATION) EVERY 6 HOURS PRN
Qty: 18 G | Refills: 2 | Status: SHIPPED | OUTPATIENT
Start: 2023-08-25

## 2023-08-25 RX ORDER — NAPROXEN 500 MG/1
500 TABLET ORAL 2 TIMES DAILY WITH MEALS
Qty: 60 TABLET | Refills: 1 | Status: SHIPPED | OUTPATIENT
Start: 2023-08-25

## 2023-08-25 RX ORDER — FLUTICASONE PROPIONATE AND SALMETEROL 250; 50 UG/1; UG/1
1 POWDER RESPIRATORY (INHALATION) 2 TIMES DAILY
Qty: 60 BLISTER | Refills: 1 | Status: SHIPPED | OUTPATIENT
Start: 2023-08-25

## 2023-08-29 ENCOUNTER — OFFICE VISIT (OUTPATIENT)
Dept: FAMILY MEDICINE CLINIC | Facility: CLINIC | Age: 48
End: 2023-08-29
Payer: COMMERCIAL

## 2023-08-29 VITALS
HEART RATE: 88 BPM | RESPIRATION RATE: 18 BRPM | SYSTOLIC BLOOD PRESSURE: 126 MMHG | DIASTOLIC BLOOD PRESSURE: 68 MMHG | WEIGHT: 152.2 LBS | BODY MASS INDEX: 27.18 KG/M2 | TEMPERATURE: 97.1 F

## 2023-08-29 DIAGNOSIS — B34.9 VIRAL INFECTION, UNSPECIFIED: Primary | ICD-10-CM

## 2023-08-29 DIAGNOSIS — J45.41 MODERATE PERSISTENT ASTHMA WITH EXACERBATION: ICD-10-CM

## 2023-08-29 LAB
SARS-COV-2 AG UPPER RESP QL IA: NEGATIVE
VALID CONTROL: NORMAL

## 2023-08-29 PROCEDURE — 99213 OFFICE O/P EST LOW 20 MIN: CPT | Performed by: NURSE PRACTITIONER

## 2023-08-29 PROCEDURE — 87811 SARS-COV-2 COVID19 W/OPTIC: CPT | Performed by: NURSE PRACTITIONER

## 2023-08-29 RX ORDER — METHYLPREDNISOLONE 4 MG/1
TABLET ORAL
Qty: 1 EACH | Refills: 0 | Status: SHIPPED | OUTPATIENT
Start: 2023-08-29 | End: 2023-09-04

## 2023-08-29 RX ORDER — NAPROXEN 500 MG/1
TABLET ORAL
COMMUNITY
Start: 2023-06-05 | End: 2023-08-29

## 2023-08-29 NOTE — PROGRESS NOTES
Assessment/Plan:     Will start on Medrol. Monitor for any increased breathing difficulties, fevers, etc.   Reviewed symptomatic treatment. 1. Viral infection, unspecified  -     Poct Covid 19 Rapid Antigen Test    2. Moderate persistent asthma with exacerbation  -     methylPREDNISolone 4 MG tablet therapy pack; Use as directed on package      Recent Results (from the past 24 hour(s))   Poct Covid 19 Rapid Antigen Test    Collection Time: 08/29/23 10:27 AM   Result Value Ref Range    POCT SARS-CoV-2 Ag Negative Negative    VALID CONTROL Valid              There are no Patient Instructions on file for this visit. No follow-ups on file. Subjective:      Patient ID: Mary Cavazos is a 49 y.o. female. Chief Complaint   Patient presents with   • sick visit      Cough, headaches, body aches,  asthma/sob  Hk CMA        Here today with URI symptoms that started approx 3 days ago. Has congestion, fatigue, body aches, cough, and SOB. Taking OTCs without relief. The following portions of the patient's history were reviewed and updated as appropriate: allergies, current medications, past family history, past medical history, past social history, past surgical history and problem list.    Review of Systems   Constitutional: Positive for fatigue. Negative for chills and fever. HENT: Positive for congestion, rhinorrhea and sore throat. Negative for ear pain, postnasal drip and sinus pressure. Respiratory: Positive for cough and shortness of breath. Negative for wheezing. Cardiovascular: Negative for chest pain. Gastrointestinal: Negative for abdominal pain, diarrhea, nausea and vomiting. Musculoskeletal: Positive for myalgias. Negative for arthralgias. Skin: Negative for rash. Neurological: Positive for headaches.          Current Outpatient Medications   Medication Sig Dispense Refill   • albuterol (2.5 mg/3 mL) 0.083 % nebulizer solution Take 3 mL (2.5 mg total) by nebulization every 6 (six) hours as needed for wheezing or shortness of breath 75 mL 0   • albuterol (PROVENTIL HFA,VENTOLIN HFA) 90 mcg/act inhaler Inhale 2 puffs every 6 (six) hours as needed for shortness of breath Rinse/spit after use 18 g 2   • clobetasol (TEMOVATE) 0.05 % ointment Apply topically 2 (two) times a day (Patient taking differently: Apply topically 2 (two) times a day As needed) 30 g 0   • DULoxetine (CYMBALTA) 30 mg delayed release capsule take 1 capsule by mouth once daily 30 capsule 1   • Fluticasone-Salmeterol (Advair Diskus) 250-50 mcg/dose inhaler Inhale 1 puff 2 (two) times a day Rinse mouth after use 60 blister 1   • hydrOXYzine HCL (ATARAX) 25 mg tablet Take 1 tablet (25 mg total) by mouth every 6 (six) hours as needed for anxiety 1-2 po q6hrs prn anxiety/insomnia 30 tablet 0   • methylPREDNISolone 4 MG tablet therapy pack Use as directed on package 1 each 0   • montelukast (SINGULAIR) 10 mg tablet take 1 tablet by mouth once daily 30 tablet 1   • multivitamin (THERAGRAN) TABS Take 1 tablet by mouth daily     • naproxen (NAPROSYN) 500 mg tablet Take 1 tablet (500 mg total) by mouth 2 (two) times a day with meals (Patient taking differently: Take 500 mg by mouth 2 (two) times a day with meals Per patient taking as needed) 60 tablet 1     No current facility-administered medications for this visit. Objective:    /68   Pulse 88   Temp (!) 97.1 °F (36.2 °C)   Resp 18   Wt 69 kg (152 lb 3.2 oz)   LMP 08/07/2023   BMI 27.18 kg/m²        Physical Exam  Vitals and nursing note reviewed. Constitutional:       General: She is not in acute distress. Appearance: Normal appearance. She is well-developed. She is not ill-appearing. HENT:      Right Ear: Tympanic membrane normal.   Eyes:      Conjunctiva/sclera: Conjunctivae normal.   Cardiovascular:      Rate and Rhythm: Normal rate and regular rhythm. Heart sounds: Normal heart sounds. No murmur heard.   Pulmonary:      Effort: Pulmonary effort is normal.      Breath sounds: Normal breath sounds. Skin:     General: Skin is warm and dry. Neurological:      Mental Status: She is alert.    Psychiatric:         Mood and Affect: Mood normal.         Behavior: Behavior normal.                NICOLA Canales

## 2023-09-24 DIAGNOSIS — F41.1 GENERALIZED ANXIETY DISORDER: ICD-10-CM

## 2023-09-24 DIAGNOSIS — M79.7 FIBROMYALGIA: ICD-10-CM

## 2023-09-24 DIAGNOSIS — J45.40 MODERATE PERSISTENT ASTHMA WITHOUT COMPLICATION: ICD-10-CM

## 2023-09-25 RX ORDER — DULOXETIN HYDROCHLORIDE 30 MG/1
30 CAPSULE, DELAYED RELEASE ORAL DAILY
Qty: 30 CAPSULE | Refills: 1 | Status: SHIPPED | OUTPATIENT
Start: 2023-09-25

## 2023-09-25 RX ORDER — MONTELUKAST SODIUM 10 MG/1
10 TABLET ORAL DAILY
Qty: 30 TABLET | Refills: 1 | Status: SHIPPED | OUTPATIENT
Start: 2023-09-25

## 2023-10-27 DIAGNOSIS — J45.40 MODERATE PERSISTENT ASTHMA WITHOUT COMPLICATION: ICD-10-CM

## 2023-10-27 RX ORDER — FLUTICASONE PROPIONATE AND SALMETEROL 50; 250 UG/1; UG/1
1 POWDER RESPIRATORY (INHALATION) 2 TIMES DAILY
Qty: 60 BLISTER | Refills: 1 | Status: SHIPPED | OUTPATIENT
Start: 2023-10-27

## 2023-11-01 ENCOUNTER — HOSPITAL ENCOUNTER (OUTPATIENT)
Dept: RADIOLOGY | Facility: HOSPITAL | Age: 48
Discharge: HOME | End: 2023-11-01
Attending: FAMILY MEDICINE
Payer: COMMERCIAL

## 2023-11-01 DIAGNOSIS — R92.8 ABNORMAL MAMMOGRAM: ICD-10-CM

## 2023-11-01 PROCEDURE — 76642 ULTRASOUND BREAST LIMITED: CPT

## 2023-11-22 DIAGNOSIS — J45.40 MODERATE PERSISTENT ASTHMA WITHOUT COMPLICATION: ICD-10-CM

## 2023-11-22 DIAGNOSIS — F41.1 GENERALIZED ANXIETY DISORDER: ICD-10-CM

## 2023-11-22 DIAGNOSIS — M79.7 FIBROMYALGIA: ICD-10-CM

## 2023-11-22 RX ORDER — DULOXETIN HYDROCHLORIDE 30 MG/1
30 CAPSULE, DELAYED RELEASE ORAL DAILY
Qty: 30 CAPSULE | Refills: 5 | Status: SHIPPED | OUTPATIENT
Start: 2023-11-22

## 2023-11-22 RX ORDER — MONTELUKAST SODIUM 10 MG/1
10 TABLET ORAL DAILY
Qty: 30 TABLET | Refills: 5 | Status: SHIPPED | OUTPATIENT
Start: 2023-11-22

## 2023-12-18 ENCOUNTER — OFFICE VISIT (OUTPATIENT)
Dept: FAMILY MEDICINE CLINIC | Facility: CLINIC | Age: 48
End: 2023-12-18
Payer: COMMERCIAL

## 2023-12-18 VITALS
DIASTOLIC BLOOD PRESSURE: 68 MMHG | BODY MASS INDEX: 27.18 KG/M2 | SYSTOLIC BLOOD PRESSURE: 126 MMHG | HEART RATE: 66 BPM | WEIGHT: 152.2 LBS | RESPIRATION RATE: 18 BRPM | TEMPERATURE: 97.2 F

## 2023-12-18 DIAGNOSIS — F41.8 SITUATIONAL ANXIETY: ICD-10-CM

## 2023-12-18 DIAGNOSIS — J45.40 MODERATE PERSISTENT ASTHMA WITHOUT COMPLICATION: ICD-10-CM

## 2023-12-18 DIAGNOSIS — L23.9 ALLERGIC DERMATITIS: Primary | ICD-10-CM

## 2023-12-18 DIAGNOSIS — M25.50 DIFFUSE ARTHRALGIA: ICD-10-CM

## 2023-12-18 PROCEDURE — 99213 OFFICE O/P EST LOW 20 MIN: CPT | Performed by: NURSE PRACTITIONER

## 2023-12-18 RX ORDER — HYDROXYZINE HYDROCHLORIDE 25 MG/1
25 TABLET, FILM COATED ORAL EVERY 6 HOURS PRN
Qty: 30 TABLET | Refills: 0 | Status: SHIPPED | OUTPATIENT
Start: 2023-12-18

## 2023-12-18 RX ORDER — NAPROXEN 500 MG/1
500 TABLET ORAL 2 TIMES DAILY WITH MEALS
Qty: 60 TABLET | Refills: 0 | Status: SHIPPED | OUTPATIENT
Start: 2023-12-18

## 2023-12-18 RX ORDER — ALBUTEROL SULFATE 90 UG/1
2 AEROSOL, METERED RESPIRATORY (INHALATION) EVERY 6 HOURS PRN
Qty: 18 G | Refills: 1 | Status: SHIPPED | OUTPATIENT
Start: 2023-12-18

## 2023-12-18 RX ORDER — METHYLPREDNISOLONE 4 MG/1
TABLET ORAL
Qty: 1 EACH | Refills: 0 | Status: SHIPPED | OUTPATIENT
Start: 2023-12-18 | End: 2023-12-24

## 2023-12-18 NOTE — PROGRESS NOTES
Assessment/Plan:    To start Pataday eye drops.   She will use Eucerin to moisturize 2-3 times daily.   F/u as needed    1. Allergic dermatitis  -     methylPREDNISolone 4 MG tablet therapy pack; Use as directed on package          Patient Instructions   Pataday (Olapatadine) allergy eye drops once daily.     No follow-ups on file.    Subjective:      Patient ID: Tracey Vernon is a 48 y.o. female.    Chief Complaint   Patient presents with   • Eczema     Dry face and skin, pt states she feels scaly. Been going on for about 3 weeks    • eyes pain     Pt states her eyelids feel like they're burning, red and puffy       Her skin has been very dry.  Her eyelids have been swollen and very itchy.  Her eyes are also itching.  She has a patch on her chin that is bothering her as well.   She has been using baby shampoo on her eyelids and a lubricating eye drop.  No new soaps, detergents, or bath products.         The following portions of the patient's history were reviewed and updated as appropriate: allergies, current medications, past family history, past medical history, past social history, past surgical history and problem list.    Review of Systems   Constitutional: Negative.    Respiratory: Negative.     Cardiovascular: Negative.    Skin:  Positive for rash.         Current Outpatient Medications   Medication Sig Dispense Refill   • Advair Diskus 250-50 MCG/ACT inhaler inhale 1 puff by mouth and INTO THE LUNGS twice a day Rinse mouth after use 60 blister 1   • albuterol (2.5 mg/3 mL) 0.083 % nebulizer solution Take 3 mL (2.5 mg total) by nebulization every 6 (six) hours as needed for wheezing or shortness of breath 75 mL 0   • albuterol (PROVENTIL HFA,VENTOLIN HFA) 90 mcg/act inhaler Inhale 2 puffs every 6 (six) hours as needed for shortness of breath Rinse/spit after use 18 g 1   • clobetasol (TEMOVATE) 0.05 % ointment Apply topically 2 (two) times a day (Patient taking differently: Apply topically 2 (two) times a  day As needed) 30 g 0   • DULoxetine (CYMBALTA) 30 mg delayed release capsule take 1 capsule by mouth once daily 30 capsule 5   • hydrOXYzine HCL (ATARAX) 25 mg tablet Take 1 tablet (25 mg total) by mouth every 6 (six) hours as needed for anxiety 1-2 po q6hrs prn anxiety/insomnia 30 tablet 0   • methylPREDNISolone 4 MG tablet therapy pack Use as directed on package 1 each 0   • montelukast (SINGULAIR) 10 mg tablet take 1 tablet by mouth once daily 30 tablet 5   • multivitamin (THERAGRAN) TABS Take 1 tablet by mouth daily     • naproxen (NAPROSYN) 500 mg tablet Take 1 tablet (500 mg total) by mouth 2 (two) times a day with meals (Patient taking differently: Take 500 mg by mouth if needed) 60 tablet 0     No current facility-administered medications for this visit.       Objective:    /68   Pulse 66   Temp (!) 97.2 °F (36.2 °C)   Resp 18   Wt 69 kg (152 lb 3.2 oz)   LMP 12/16/2023 (Exact Date)   BMI 27.18 kg/m²        Physical Exam  Vitals and nursing note reviewed.   Constitutional:       Appearance: Normal appearance. She is well-developed. She is not ill-appearing or diaphoretic.   HENT:      Head: Normocephalic and atraumatic.      Mouth/Throat:      Pharynx: Oropharynx is clear.   Eyes:      Conjunctiva/sclera: Conjunctivae normal.      Comments: Upper eyelid swelling, dryness, and erythema.    Dryness nasolabial folds.  Patchy erythema on chin   Pulmonary:      Effort: Pulmonary effort is normal. No tachypnea, accessory muscle usage or respiratory distress.   Musculoskeletal:      Cervical back: Normal range of motion.   Skin:     Coloration: Skin is not pale.   Neurological:      Mental Status: She is alert.   Psychiatric:         Mood and Affect: Mood normal.         Speech: Speech normal.         Behavior: Behavior normal.                NICOLA Yusuf

## 2023-12-18 NOTE — LETTER
December 18, 2023     Patient: Tracey Vernon  YOB: 1975  Date of Visit: 12/18/2023      To Whom it May Concern:    Tracey Vernon is under my professional care. Tracey was seen in my office on 12/18/2023.    If you have any questions or concerns, please don't hesitate to call.         Sincerely,          NICOLA Yusuf        CC:   No Recipients

## 2023-12-18 NOTE — TELEPHONE ENCOUNTER
Dolly COLLADO    12/18/23 11:38 AM  Note      Patient needs updated blood work. Please place orders. A courtesy refill was provided.

## 2023-12-28 DIAGNOSIS — J45.40 MODERATE PERSISTENT ASTHMA WITHOUT COMPLICATION: ICD-10-CM

## 2023-12-28 RX ORDER — FLUTICASONE PROPIONATE AND SALMETEROL 50; 250 UG/1; UG/1
1 POWDER RESPIRATORY (INHALATION) 2 TIMES DAILY
Qty: 60 BLISTER | Refills: 1 | Status: SHIPPED | OUTPATIENT
Start: 2023-12-28

## 2024-01-03 ENCOUNTER — OFFICE VISIT (OUTPATIENT)
Dept: FAMILY MEDICINE CLINIC | Facility: CLINIC | Age: 49
End: 2024-01-03
Payer: COMMERCIAL

## 2024-01-03 ENCOUNTER — TELEPHONE (OUTPATIENT)
Age: 49
End: 2024-01-03

## 2024-01-03 VITALS
DIASTOLIC BLOOD PRESSURE: 70 MMHG | RESPIRATION RATE: 18 BRPM | BODY MASS INDEX: 27.11 KG/M2 | OXYGEN SATURATION: 98 % | HEIGHT: 63 IN | HEART RATE: 90 BPM | WEIGHT: 153 LBS | SYSTOLIC BLOOD PRESSURE: 122 MMHG | TEMPERATURE: 97.4 F

## 2024-01-03 DIAGNOSIS — H02.841 PAIN AND SWELLING OF UPPER EYELID OF BOTH EYES: Primary | ICD-10-CM

## 2024-01-03 DIAGNOSIS — H02.844 PAIN AND SWELLING OF UPPER EYELID OF BOTH EYES: Primary | ICD-10-CM

## 2024-01-03 DIAGNOSIS — H02.89 PAIN AND SWELLING OF UPPER EYELID OF BOTH EYES: Primary | ICD-10-CM

## 2024-01-03 DIAGNOSIS — L23.9 ALLERGIC DERMATITIS: ICD-10-CM

## 2024-01-03 PROCEDURE — 99214 OFFICE O/P EST MOD 30 MIN: CPT | Performed by: FAMILY MEDICINE

## 2024-01-03 RX ORDER — SULFAMETHOXAZOLE AND TRIMETHOPRIM 800; 160 MG/1; MG/1
1 TABLET ORAL 2 TIMES DAILY
Qty: 14 TABLET | Refills: 0 | Status: SHIPPED | OUTPATIENT
Start: 2024-01-03 | End: 2024-01-10

## 2024-01-03 RX ORDER — PREDNISONE 10 MG/1
TABLET ORAL
Qty: 30 TABLET | Refills: 0 | Status: SHIPPED | OUTPATIENT
Start: 2024-01-03 | End: 2024-01-15

## 2024-01-03 RX ORDER — ERYTHROMYCIN 5 MG/G
0.5 OINTMENT OPHTHALMIC EVERY 6 HOURS SCHEDULED
Qty: 3.5 G | Refills: 0 | Status: SHIPPED | OUTPATIENT
Start: 2024-01-03 | End: 2024-01-04 | Stop reason: SDUPTHER

## 2024-01-03 NOTE — TELEPHONE ENCOUNTER
Patient mother krupa called to check on today appt and did ask to call her daughter and inform on today appt too. Did call in the pt and informed on her today office visit with Dr. Bibi harvey. Thanks.

## 2024-01-03 NOTE — PROGRESS NOTES
Name: Tracey Vernon      : 1975      MRN: [id number]  Encounter Provider: Bibi Louis MD  Encounter Date: 1/3/2024   Encounter department: [facility]    Assessment & Plan     1. Pain and swelling of upper eyelid of both eyes  -     predniSONE 10 mg tablet; Take 4 pills/d for 3 days then 3 pills/d for 3 days, then 2 pills/d for 3 days then 1 pill/d for 3 days then stop  -     sulfamethoxazole-trimethoprim (BACTRIM DS) 800-160 mg per tablet; Take 1 tablet by mouth 2 (two) times a day for 7 days  -     erythromycin (ILOTYCIN) ophthalmic ointment; Administer 0.5 inches to both eyes every 6 (six) hours  -     Ambulatory referral to Dermatology; Future    2. Allergic dermatitis  -     Ambulatory Referral to Allergy; Future  -     Ambulatory referral to Dermatology; Future       Unclear etiology of her current dry, swollen, erythematous, itchy eyes. She tried course of medrol dose pack with no improvement. Will try a longer steroid taper, but also start bactrim to cover for possible bacterial infection. Unclear if she has constant exposure to an allergan that can be causing these symptoms. Will have her see allergist. If rash does not improve, she should see derm.   Aware to go to the ER if her vision becomes affected.     Subjective      HPI  She reports 3 week history of bilateral eyelid swelling, burning, itching. She also has been having patches of dry red skin on her face intermittently. Has been putting coconut oil and moisturizing with no improvement. She was seen here on 23 and prescribed medrol dose pack which did not help. No vision changes other than the swelling in the eyes making it difficult to see.     Review of Systems   Eyes:         Eyelid swelling, itching, redness   Skin:         Dry red skin patches on face       Current Outpatient Medications on File Prior to Visit   Medication Sig    Advair Diskus 250-50 MCG/ACT inhaler inhale 1 puff by mouth and INTO THE LUNGS twice a  "day Rinse mouth after use    albuterol (2.5 mg/3 mL) 0.083 % nebulizer solution Take 3 mL (2.5 mg total) by nebulization every 6 (six) hours as needed for wheezing or shortness of breath    albuterol (PROVENTIL HFA,VENTOLIN HFA) 90 mcg/act inhaler Inhale 2 puffs every 6 (six) hours as needed for shortness of breath Rinse/spit after use    clobetasol (TEMOVATE) 0.05 % ointment Apply topically 2 (two) times a day (Patient taking differently: Apply topically 2 (two) times a day As needed)    DULoxetine (CYMBALTA) 30 mg delayed release capsule take 1 capsule by mouth once daily    hydrOXYzine HCL (ATARAX) 25 mg tablet Take 1 tablet (25 mg total) by mouth every 6 (six) hours as needed for anxiety 1-2 po q6hrs prn anxiety/insomnia    montelukast (SINGULAIR) 10 mg tablet take 1 tablet by mouth once daily    multivitamin (THERAGRAN) TABS Take 1 tablet by mouth daily    naproxen (NAPROSYN) 500 mg tablet Take 1 tablet (500 mg total) by mouth 2 (two) times a day with meals (Patient taking differently: Take 500 mg by mouth if needed)       Objective     /70   Pulse 90   Temp (!) 97.4 °F (36.3 °C)   Resp 18   Ht 5' 2.5\" (1.588 m)   Wt 69.4 kg (153 lb)   LMP 01/02/2024 (Exact Date)   SpO2 98%   BMI 27.54 kg/m²     Physical Exam  Constitutional:       General: She is not in acute distress.     Appearance: She is well-developed. She is not diaphoretic.   HENT:      Head: Normocephalic and atraumatic.   Eyes:      General: No scleral icterus.        Right eye: No discharge.         Left eye: No discharge.      Conjunctiva/sclera: Conjunctivae normal.      Comments: Dry, swollen, erythematous upper eyelids bilaterally as noted in image below   Pulmonary:      Effort: Pulmonary effort is normal.   Musculoskeletal:      Cervical back: Normal range of motion.   Skin:     General: Skin is warm.      Findings: Rash (dry flaking erythematous patch near chin.) present.   Neurological:      Mental Status: She is alert and " oriented to person, place, and time.   Psychiatric:         Behavior: Behavior normal.         Thought Content: Thought content normal.         Judgment: Judgment normal.          Bibi Louis MD

## 2024-01-04 ENCOUNTER — TELEPHONE (OUTPATIENT)
Age: 49
End: 2024-01-04

## 2024-01-04 DIAGNOSIS — H02.89 PAIN AND SWELLING OF UPPER EYELID OF BOTH EYES: ICD-10-CM

## 2024-01-04 DIAGNOSIS — H02.841 PAIN AND SWELLING OF UPPER EYELID OF BOTH EYES: ICD-10-CM

## 2024-01-04 DIAGNOSIS — H02.844 PAIN AND SWELLING OF UPPER EYELID OF BOTH EYES: ICD-10-CM

## 2024-01-04 RX ORDER — ERYTHROMYCIN 5 MG/G
0.5 OINTMENT OPHTHALMIC EVERY 6 HOURS SCHEDULED
Qty: 3.5 G | Refills: 0 | Status: SHIPPED | OUTPATIENT
Start: 2024-01-04

## 2024-01-04 NOTE — TELEPHONE ENCOUNTER
Patient calling to report that she was able to  all the medications except for the erythromycin ophthalmic ointment. The pharmacy does not carry this product. Please review and prescribe a different product.

## 2024-01-06 DIAGNOSIS — J45.901 ASTHMA EXACERBATION: ICD-10-CM

## 2024-01-06 DIAGNOSIS — J45.40 MODERATE PERSISTENT ASTHMA WITHOUT COMPLICATION: ICD-10-CM

## 2024-01-08 RX ORDER — FLUTICASONE PROPIONATE AND SALMETEROL 250; 50 UG/1; UG/1
1 POWDER RESPIRATORY (INHALATION) 2 TIMES DAILY
Qty: 60 BLISTER | Refills: 0 | Status: SHIPPED | OUTPATIENT
Start: 2024-01-08

## 2024-01-08 RX ORDER — ALBUTEROL SULFATE 2.5 MG/3ML
2.5 SOLUTION RESPIRATORY (INHALATION) EVERY 6 HOURS PRN
Qty: 75 ML | Refills: 0 | Status: SHIPPED | OUTPATIENT
Start: 2024-01-08

## 2024-01-23 DIAGNOSIS — M25.50 DIFFUSE ARTHRALGIA: ICD-10-CM

## 2024-01-23 DIAGNOSIS — J45.40 MODERATE PERSISTENT ASTHMA WITHOUT COMPLICATION: ICD-10-CM

## 2024-01-23 DIAGNOSIS — J45.901 ASTHMA EXACERBATION: ICD-10-CM

## 2024-01-23 RX ORDER — ALBUTEROL SULFATE 90 UG/1
2 AEROSOL, METERED RESPIRATORY (INHALATION) EVERY 6 HOURS PRN
Qty: 18 G | Refills: 1 | Status: SHIPPED | OUTPATIENT
Start: 2024-01-23

## 2024-01-24 RX ORDER — FLUTICASONE PROPIONATE AND SALMETEROL 250; 50 UG/1; UG/1
1 POWDER RESPIRATORY (INHALATION) 2 TIMES DAILY
Qty: 60 BLISTER | Refills: 0 | Status: SHIPPED | OUTPATIENT
Start: 2024-01-24

## 2024-01-24 RX ORDER — NAPROXEN 500 MG/1
500 TABLET ORAL 2 TIMES DAILY WITH MEALS
Qty: 60 TABLET | Refills: 0 | Status: SHIPPED | OUTPATIENT
Start: 2024-01-24

## 2024-01-24 RX ORDER — ALBUTEROL SULFATE 2.5 MG/3ML
2.5 SOLUTION RESPIRATORY (INHALATION) EVERY 6 HOURS PRN
Qty: 75 ML | Refills: 0 | Status: SHIPPED | OUTPATIENT
Start: 2024-01-24

## 2024-02-17 DIAGNOSIS — J45.40 MODERATE PERSISTENT ASTHMA WITHOUT COMPLICATION: ICD-10-CM

## 2024-02-17 DIAGNOSIS — F41.1 GENERALIZED ANXIETY DISORDER: ICD-10-CM

## 2024-02-17 DIAGNOSIS — M25.50 DIFFUSE ARTHRALGIA: ICD-10-CM

## 2024-02-17 DIAGNOSIS — M79.7 FIBROMYALGIA: ICD-10-CM

## 2024-02-18 RX ORDER — DULOXETIN HYDROCHLORIDE 30 MG/1
30 CAPSULE, DELAYED RELEASE ORAL DAILY
Qty: 30 CAPSULE | Refills: 0 | Status: SHIPPED | OUTPATIENT
Start: 2024-02-18

## 2024-02-18 RX ORDER — MONTELUKAST SODIUM 10 MG/1
10 TABLET ORAL DAILY
Qty: 30 TABLET | Refills: 0 | Status: SHIPPED | OUTPATIENT
Start: 2024-02-18

## 2024-02-21 PROBLEM — Z01.419 WOMEN'S ANNUAL ROUTINE GYNECOLOGICAL EXAMINATION: Status: RESOLVED | Noted: 2021-05-05 | Resolved: 2024-02-21

## 2024-02-23 RX ORDER — NAPROXEN 500 MG/1
500 TABLET ORAL 2 TIMES DAILY WITH MEALS
Qty: 60 TABLET | Refills: 0 | Status: SHIPPED | OUTPATIENT
Start: 2024-02-23

## 2024-03-11 ENCOUNTER — APPOINTMENT (OUTPATIENT)
Dept: RADIOLOGY | Facility: CLINIC | Age: 49
End: 2024-03-11
Payer: COMMERCIAL

## 2024-03-11 ENCOUNTER — OFFICE VISIT (OUTPATIENT)
Dept: FAMILY MEDICINE CLINIC | Facility: CLINIC | Age: 49
End: 2024-03-11
Payer: COMMERCIAL

## 2024-03-11 ENCOUNTER — PATIENT MESSAGE (OUTPATIENT)
Dept: FAMILY MEDICINE CLINIC | Facility: CLINIC | Age: 49
End: 2024-03-11

## 2024-03-11 VITALS
WEIGHT: 156 LBS | TEMPERATURE: 98.2 F | SYSTOLIC BLOOD PRESSURE: 120 MMHG | RESPIRATION RATE: 16 BRPM | DIASTOLIC BLOOD PRESSURE: 80 MMHG | BODY MASS INDEX: 28.08 KG/M2 | HEART RATE: 83 BPM

## 2024-03-11 DIAGNOSIS — M25.512 ACUTE PAIN OF LEFT SHOULDER: ICD-10-CM

## 2024-03-11 DIAGNOSIS — M79.7 FIBROMYALGIA: Primary | ICD-10-CM

## 2024-03-11 DIAGNOSIS — M25.50 DIFFUSE ARTHRALGIA: ICD-10-CM

## 2024-03-11 DIAGNOSIS — M25.512 ACUTE PAIN OF LEFT SHOULDER: Primary | ICD-10-CM

## 2024-03-11 PROCEDURE — 99213 OFFICE O/P EST LOW 20 MIN: CPT | Performed by: NURSE PRACTITIONER

## 2024-03-11 PROCEDURE — 73060 X-RAY EXAM OF HUMERUS: CPT

## 2024-03-11 PROCEDURE — 73030 X-RAY EXAM OF SHOULDER: CPT

## 2024-03-11 NOTE — PATIENT INSTRUCTIONS
The patient was advised that NSAID-type medications have two very important potential side effects: gastrointestinal irritation including hemorrhage and renal injuries. She was asked to take the medication with food and to stop if she experiences any GI upset. I asked her to call for vomiting, abdominal pain or black/bloody stools. The patient expresses understanding of these issues and questions were answered.

## 2024-03-11 NOTE — LETTER
March 11, 2024     Patient: Tracey Vernon  YOB: 1975  Date of Visit: 3/11/2024      To Whom it May Concern:    Tracey Vernon is under my professional care. Tracey was seen in my office on 3/11/2024. Please excuse from work on 03/11/2024 and 03/12/2024    If you have any questions or concerns, please don't hesitate to call.         Sincerely,          NICOLA Urban        CC: No Recipients

## 2024-03-11 NOTE — PROGRESS NOTES
Assessment/Plan:    1. Acute pain of left shoulder  -     XR shoulder 2+ vw left; Future; Expected date: 03/11/2024  -     XR humerus left; Future; Expected date: 03/11/2024            Patient Instructions:  The patient was advised that NSAID-type medications have two very important potential side effects: gastrointestinal irritation including hemorrhage and renal injuries. She was asked to take the medication with food and to stop if she experiences any GI upset. I asked her to call for vomiting, abdominal pain or black/bloody stools. The patient expresses understanding of these issues and questions were answered.    Return in about 2 days (around 3/13/2024).      Future Appointments   Date Time Provider Department Center   3/11/2024  2:30 PM [facility]   3/13/2024  1:15 PM Bibi Louis MD [facility]           Subjective:      Patient ID: Tracey Vernon is a 48 y.o. female.    Chief Complaint   Patient presents with   • Shoulder Pain   • Bursitis   • Sciatica     Sas/cma         Vitals:  /80   Pulse 83   Temp 98.2 °F (36.8 °C)   Resp 16   Wt 70.8 kg (156 lb)   LMP 03/11/2024 (Exact Date)   BMI 28.08 kg/m²     HPI  Patient stated that currently under care of chiropractor for left sided sciatica and bursitis. Stated that now since yesterday having left upper arm and left shoulder pain and able to lift her arm above chest level. Denies any injury and falls    The following portions of the patient's history were reviewed and updated as appropriate: allergies, current medications, past family history, past medical history, past social history, past surgical history and problem list.      Review of Systems   HENT: Negative.     Respiratory: Negative.     Cardiovascular: Negative.    Gastrointestinal: Negative.    Musculoskeletal:  Positive for arthralgias.         Objective:    Social History     Tobacco Use   Smoking Status Former   • Types: Cigarettes   • Start date:  1/28/2011   Smokeless Tobacco Never       Allergies:   Allergies   Allergen Reactions   • Penicillins Shortness Of Breath   • Penicillins Hives and Shortness Of Breath   • Other      DOG   • Pollen Extract          Current Outpatient Medications   Medication Sig Dispense Refill   • albuterol (2.5 mg/3 mL) 0.083 % nebulizer solution Take 3 mL (2.5 mg total) by nebulization every 6 (six) hours as needed for wheezing or shortness of breath 75 mL 0   • albuterol (PROVENTIL HFA,VENTOLIN HFA) 90 mcg/act inhaler Inhale 2 puffs every 6 (six) hours as needed for shortness of breath Rinse/spit after use 18 g 1   • clobetasol (TEMOVATE) 0.05 % ointment Apply topically 2 (two) times a day (Patient taking differently: Apply topically 2 (two) times a day As needed) 30 g 0   • DULoxetine (CYMBALTA) 30 mg delayed release capsule Take 1 capsule (30 mg total) by mouth daily 30 capsule 0   • erythromycin (ILOTYCIN) ophthalmic ointment Administer 0.5 inches to both eyes every 6 (six) hours 3.5 g 0   • Fluticasone-Salmeterol (Advair) 250-50 mcg/dose inhaler INHALE 1 PUFF 2 TIMES A DAY RINSE MOUTH AFTER USE. 60 blister 0   • hydrOXYzine HCL (ATARAX) 25 mg tablet Take 1 tablet (25 mg total) by mouth every 6 (six) hours as needed for anxiety 1-2 po q6hrs prn anxiety/insomnia 30 tablet 0   • montelukast (SINGULAIR) 10 mg tablet Take 1 tablet (10 mg total) by mouth daily 30 tablet 0   • multivitamin (THERAGRAN) TABS Take 1 tablet by mouth daily     • naproxen (NAPROSYN) 500 mg tablet TAKE 1 TABLET BY MOUTH TWICE A DAY WITH MEALS 60 tablet 0     No current facility-administered medications for this visit.          Physical Exam  Constitutional:       Appearance: Normal appearance.   HENT:      Head: Normocephalic and atraumatic.      Nose: Nose normal.   Eyes:      Conjunctiva/sclera: Conjunctivae normal.   Cardiovascular:      Rate and Rhythm: Normal rate and regular rhythm.      Pulses: Normal pulses.      Heart sounds: Normal heart sounds.    Pulmonary:      Effort: Pulmonary effort is normal.      Breath sounds: Normal breath sounds.   Musculoskeletal:      Left shoulder: Tenderness present. No swelling. Decreased range of motion.      Left upper arm: Tenderness present. No swelling.   Skin:     General: Skin is warm and dry.      Findings: No rash.   Neurological:      Mental Status: She is alert and oriented to person, place, and time.   Psychiatric:         Mood and Affect: Mood normal.         Behavior: Behavior normal.         Thought Content: Thought content normal.         Judgment: Judgment normal.                     NICOLA Urban

## 2024-03-12 ENCOUNTER — TELEPHONE (OUTPATIENT)
Dept: FAMILY MEDICINE CLINIC | Facility: CLINIC | Age: 49
End: 2024-03-12

## 2024-03-12 NOTE — TELEPHONE ENCOUNTER
----- Message from NICOLA Urban sent at 3/12/2024  9:51 AM EDT -----  Please let patient know that her left shoulder and left upper arm xray is normal and will follow with Dr. Louis tomorrow. NICOLA Urban

## 2024-03-13 ENCOUNTER — OFFICE VISIT (OUTPATIENT)
Dept: FAMILY MEDICINE CLINIC | Facility: CLINIC | Age: 49
End: 2024-03-13
Payer: COMMERCIAL

## 2024-03-13 VITALS
HEART RATE: 92 BPM | RESPIRATION RATE: 16 BRPM | DIASTOLIC BLOOD PRESSURE: 62 MMHG | HEIGHT: 63 IN | SYSTOLIC BLOOD PRESSURE: 106 MMHG | BODY MASS INDEX: 28.42 KG/M2 | TEMPERATURE: 98 F | WEIGHT: 160.4 LBS

## 2024-03-13 DIAGNOSIS — M54.2 CHRONIC NECK PAIN: Primary | ICD-10-CM

## 2024-03-13 DIAGNOSIS — G89.29 CHRONIC NECK PAIN: Primary | ICD-10-CM

## 2024-03-13 DIAGNOSIS — M79.7 FIBROMYALGIA: ICD-10-CM

## 2024-03-13 PROCEDURE — 99214 OFFICE O/P EST MOD 30 MIN: CPT | Performed by: FAMILY MEDICINE

## 2024-03-13 RX ORDER — DULOXETIN HYDROCHLORIDE 60 MG/1
60 CAPSULE, DELAYED RELEASE ORAL DAILY
Qty: 30 CAPSULE | Refills: 1 | Status: SHIPPED | OUTPATIENT
Start: 2024-03-13

## 2024-03-13 NOTE — ASSESSMENT & PLAN NOTE
She has had a history of fibromyalgia for a while and was previously seen by rheumatologist Dr. Gonzalez who retired. She has had trouble finding a new rheumatologist who manages fibromyalgia. She has been on gabapentin in the past, but didn't like the way it made her feel. She is currently on cymbalta 30mg daily and feels like it is not helping with her pain. Will increase dose of cymbalta to 60mg daily.   She is aware to check with her insurance company for recommendations of rheumatologists outside of [facility] that do manage fibromyalgia.

## 2024-03-13 NOTE — PROGRESS NOTES
Name: Tracey Vernon      : 1975      MRN: [id number]  Encounter Provider: Bibi Louis MD  Encounter Date: 3/13/2024   Encounter department: [facility]    Assessment & Plan     1. Chronic neck pain  Comments:  She reports seeing a chiropractor recently and having xrays done showing arthritis. No records available. She has pain throughout LUE. Will refer to pain/spine.  Orders:  -     DULoxetine (CYMBALTA) 60 mg delayed release capsule; Take 1 capsule (60 mg total) by mouth daily  -     Ambulatory referral to Spine & Pain Management; Future    2. Fibromyalgia  Assessment & Plan:  She has had a history of fibromyalgia for a while and was previously seen by rheumatologist Dr. Gonzalez who retired. She has had trouble finding a new rheumatologist who manages fibromyalgia. She has been on gabapentin in the past, but didn't like the way it made her feel. She is currently on cymbalta 30mg daily and feels like it is not helping with her pain. Will increase dose of cymbalta to 60mg daily.   She is aware to check with her insurance company for recommendations of rheumatologists outside of [facility] that do manage fibromyalgia.     Orders:  -     DULoxetine (CYMBALTA) 60 mg delayed release capsule; Take 1 capsule (60 mg total) by mouth daily       Tracey did inquire about long term disability today, but I did let her know that our office does not do long term disability paperwork. She will have to do this through a specialist. She is going to find a new rheumatologist for her fibromyalgia and establish with a pain management doctor as well.     Shayy BRAN  Tracey is here today to discuss her chronic pain. She has a history of fibromyalgia and was previously following rheumatologist Dr. Gonzalez who recently retired. She has been having trouble finding a specialist in the area who will manage her fibromyalgia and doesn't like the way some of the medications make her feel. Currently she is on  cymbalta.   Her most pressing problem right now is left upper extremity pain that starts at the base of her neck/left upper back and goes down her left upper extremity down to the elbow. She has pain, stiffness, weakness. States she cannot work due to her pain. She had xrays of her shoulder and elbow with no abnormalities. States she has been seeing a chiropractor who did imaging of her neck which showed arthritis.     Review of Systems   Constitutional: Negative.    HENT: Negative.     Eyes: Negative.    Respiratory: Negative.     Cardiovascular: Negative.    Gastrointestinal: Negative.    Endocrine: Negative.    Genitourinary: Negative.    Musculoskeletal:  Positive for arthralgias, myalgias and neck pain.   Skin: Negative.    Allergic/Immunologic: Negative.    Neurological: Negative.    Hematological: Negative.    Psychiatric/Behavioral: Negative.         Current Outpatient Medications on File Prior to Visit   Medication Sig    albuterol (2.5 mg/3 mL) 0.083 % nebulizer solution Take 3 mL (2.5 mg total) by nebulization every 6 (six) hours as needed for wheezing or shortness of breath    albuterol (PROVENTIL HFA,VENTOLIN HFA) 90 mcg/act inhaler Inhale 2 puffs every 6 (six) hours as needed for shortness of breath Rinse/spit after use    clobetasol (TEMOVATE) 0.05 % ointment Apply topically 2 (two) times a day (Patient taking differently: Apply topically 2 (two) times a day As needed)    erythromycin (ILOTYCIN) ophthalmic ointment Administer 0.5 inches to both eyes every 6 (six) hours    Fluticasone-Salmeterol (Advair) 250-50 mcg/dose inhaler INHALE 1 PUFF 2 TIMES A DAY RINSE MOUTH AFTER USE.    hydrOXYzine HCL (ATARAX) 25 mg tablet Take 1 tablet (25 mg total) by mouth every 6 (six) hours as needed for anxiety 1-2 po q6hrs prn anxiety/insomnia    montelukast (SINGULAIR) 10 mg tablet Take 1 tablet (10 mg total) by mouth daily    multivitamin (THERAGRAN) TABS Take 1 tablet by mouth daily    naproxen (NAPROSYN) 500 mg  "tablet TAKE 1 TABLET BY MOUTH TWICE A DAY WITH MEALS    [DISCONTINUED] DULoxetine (CYMBALTA) 30 mg delayed release capsule Take 1 capsule (30 mg total) by mouth daily       Objective     /62   Pulse 92   Temp 98 °F (36.7 °C)   Resp 16   Ht 5' 2.5\" (1.588 m)   Wt 72.8 kg (160 lb 6.4 oz)   LMP 03/11/2024 (Exact Date)   BMI 28.87 kg/m²     Physical Exam  Constitutional:       General: She is not in acute distress.     Appearance: She is well-developed. She is not diaphoretic.   HENT:      Head: Normocephalic and atraumatic.   Cardiovascular:      Rate and Rhythm: Normal rate and regular rhythm.      Heart sounds: Normal heart sounds. No murmur heard.     No friction rub. No gallop.   Pulmonary:      Effort: Pulmonary effort is normal. No respiratory distress.      Breath sounds: Normal breath sounds. No wheezing or rales.   Chest:      Chest wall: No tenderness.   Musculoskeletal:         General: No deformity.      Left shoulder: Decreased range of motion.      Cervical back: Neck supple. No edema, erythema, signs of trauma, rigidity, torticollis or crepitus. Pain with movement and muscular tenderness present. No spinous process tenderness. Decreased range of motion.   Skin:     General: Skin is warm and dry.   Neurological:      General: No focal deficit present.      Mental Status: She is alert and oriented to person, place, and time.   Psychiatric:         Behavior: Behavior normal.         Thought Content: Thought content normal.         Judgment: Judgment normal.       Bibi Louis MD    "

## 2024-03-15 DIAGNOSIS — J45.40 MODERATE PERSISTENT ASTHMA WITHOUT COMPLICATION: ICD-10-CM

## 2024-03-15 RX ORDER — MONTELUKAST SODIUM 10 MG/1
10 TABLET ORAL DAILY
Qty: 90 TABLET | Refills: 1 | Status: SHIPPED | OUTPATIENT
Start: 2024-03-15

## 2024-03-18 ENCOUNTER — TELEPHONE (OUTPATIENT)
Age: 49
End: 2024-03-18

## 2024-03-18 ENCOUNTER — CONSULT (OUTPATIENT)
Dept: PAIN MEDICINE | Facility: CLINIC | Age: 49
End: 2024-03-18
Payer: COMMERCIAL

## 2024-03-18 ENCOUNTER — APPOINTMENT (OUTPATIENT)
Dept: RADIOLOGY | Facility: CLINIC | Age: 49
End: 2024-03-18
Payer: COMMERCIAL

## 2024-03-18 VITALS
SYSTOLIC BLOOD PRESSURE: 139 MMHG | DIASTOLIC BLOOD PRESSURE: 81 MMHG | HEART RATE: 85 BPM | WEIGHT: 155 LBS | BODY MASS INDEX: 27.9 KG/M2

## 2024-03-18 DIAGNOSIS — M54.50 CHRONIC BILATERAL LOW BACK PAIN WITHOUT SCIATICA: ICD-10-CM

## 2024-03-18 DIAGNOSIS — G89.4 CHRONIC PAIN SYNDROME: Primary | ICD-10-CM

## 2024-03-18 DIAGNOSIS — G89.29 CHRONIC NECK PAIN: ICD-10-CM

## 2024-03-18 DIAGNOSIS — G89.29 CHRONIC BILATERAL LOW BACK PAIN WITHOUT SCIATICA: ICD-10-CM

## 2024-03-18 DIAGNOSIS — G89.4 CHRONIC PAIN SYNDROME: ICD-10-CM

## 2024-03-18 DIAGNOSIS — M54.2 CHRONIC NECK PAIN: ICD-10-CM

## 2024-03-18 PROCEDURE — 72040 X-RAY EXAM NECK SPINE 2-3 VW: CPT

## 2024-03-18 PROCEDURE — 72100 X-RAY EXAM L-S SPINE 2/3 VWS: CPT

## 2024-03-18 PROCEDURE — 99203 OFFICE O/P NEW LOW 30 MIN: CPT | Performed by: STUDENT IN AN ORGANIZED HEALTH CARE EDUCATION/TRAINING PROGRAM

## 2024-03-18 RX ORDER — TIZANIDINE 2 MG/1
2 TABLET ORAL EVERY 8 HOURS PRN
Qty: 90 TABLET | Refills: 0 | Status: SHIPPED | OUTPATIENT
Start: 2024-03-18 | End: 2024-04-17

## 2024-03-18 NOTE — LETTER
March 18, 2024     Patient: Tracey Vernon  YOB: 1975  Date of Visit: 3/18/2024      To Whom it May Concern:    Tracey Vernon is under my professional care. Tracey was seen in my office on 3/18/2024. Tracey may return to work with limitations 3/21 . Restriction in heavy lifting above the head.     If you have any questions or concerns, please don't hesitate to call.         Sincerely,          Kin Daigle MD        CC: No Recipients

## 2024-03-18 NOTE — PROGRESS NOTES
Assessment:  1. Chronic pain syndrome    2. Chronic neck pain    3. Chronic bilateral low back pain without sciatica    Patient is a 48-year-old woman who presents with 4 years of bilateral neck pain with radicular symptoms and axial low back pain.  Over the past month the intensity pain has been severe and she rates pain currently is 8 out of 10 on numeric rating scale.  The pain does interfere with her daily activities.  The pain is occurring constantly, 100% of the time and there is no pattern in regards to the time of day where symptoms are better or worse she describes the pain as burning, shooting, numbing, sharp, throbbing, dull and aching.  She has had some weakness in the upper and lower extremities and reports dropping objects.  She does not use any assistive devices.  Activities that increase her pain include lying down, standing, bending, sitting, walking, exercise, relaxation, coughing, sneezing, bowel movement.  Patient does have x-rays of her cervical and lumbar spine but unfortunately we do not have the read.  Patient has no prior surgeries or injections.  Past medical history significant for fibromyalgia and asthma.  Patient also with carpal tunnel and lupus.  Prior pain treatments include physical therapy for the back which did not provide any relief, heat and ice which provides moderate relief and chiropractic manipulation which buys no relief.  Patient does smoke marijuana she does not use tobacco or alcohol.  Prior pain medications include tramadol which did not help, naproxen currently which does not help, Flexeril in the past which does not help, duloxetine currently which does not help, gabapentin in the past which did not help and Lyrica in the past which did not help.    On exam patient with tenderness to palpation of bilateral cervical paraspinal muscles with taut bands appreciated and positive jump sign.  Patient also with weakness of abduction of the left arm.  To further evaluate  patient's pain today we will order an x-ray of the cervical and lumbar spine.  Additionally patient counseled to continue with physical therapy and home exercise program.  Lastly for symptomatic relief we will start tizanidine 2 mg 3 times daily as needed.  Patient not interested in injection therapy or other medications.  Extensive discussion with patient in regards to disability and counseled patient that my office does not do this she would have to follow-up with her PCP.    Plan:  Start tizanidine 2mg TID prn  Ambulatory referral to PT for neck and low back pain   X-ray cervical spine  X-ray lumbar spine  Continue with chiropractic care as tolerated   Can consider TPI of bilateral cervical paraspinal muscles in the future   Orders Placed This Encounter   Procedures   • XR spine cervical 2 or 3 vw injury     Standing Status:   Future     Number of Occurrences:   1     Standing Expiration Date:   3/18/2028     Scheduling Instructions:      Bring along any outside films relating to this procedure.           Order Specific Question:   Is the patient pregnant?     Answer:   No   • X-ray lumbar spine 2 or 3 views     Standing Status:   Future     Number of Occurrences:   1     Standing Expiration Date:   3/18/2028     Scheduling Instructions:      Bring along any outside films relating to this procedure.           Order Specific Question:   Is the patient pregnant?     Answer:   No   • Ambulatory referral to Physical Therapy     Standing Status:   Future     Standing Expiration Date:   3/18/2025     Referral Priority:   Routine     Referral Type:   Physical Therapy     Referral Reason:   Specialty Services Required     Requested Specialty:   Physical Therapy     Number of Visits Requested:   1     Expiration Date:   3/18/2025         New Medications Ordered This Visit   Medications   • tiZANidine (ZANAFLEX) 2 mg tablet     Sig: Take 1 tablet (2 mg total) by mouth every 8 (eight) hours as needed for muscle spasms      Dispense:  90 tablet     Refill:  0       My impressions and treatment recommendations were discussed in detail with the patient, who verbalized understanding and had no further questions.      Follow-up is planned in six weeks time or sooner as warranted.  Discharge instructions were provided. I personally saw and examined the patient and I agree with the above discussed plan of care.    History of Present Illness:    Tracey Vernon is a 48 y.o. female who presents to [facility] for initial evaluation of the above stated pain complaints. The patient has a past medical and chronic pain history as outlined in the assessment section. She was referred by Bibi Louis MD  [address] [place] [address].    Patient is a 48-year-old woman who presents with 4 years of bilateral neck pain with radicular symptoms and axial low back pain.  Over the past month the intensity pain has been severe and she rates pain currently is 8 out of 10 on numeric rating scale.  The pain does interfere with her daily activities.  The pain is occurring constantly, 100% of the time and there is no pattern in regards to the time of day where symptoms are better or worse she describes the pain as burning, shooting, numbing, sharp, throbbing, dull and aching.  She has had some weakness in the upper and lower extremities and reports dropping objects.  She does not use any assistive devices.  Activities that increase her pain include lying down, standing, bending, sitting, walking, exercise, relaxation, coughing, sneezing, bowel movement.  Patient does have x-rays of her cervical and lumbar spine but unfortunately we do not have the read.  Patient has no prior surgeries or injections.  Past medical history significant for fibromyalgia and asthma.  Patient also with carpal tunnel and lupus.  Prior pain treatments include physical therapy for the back which did not provide any relief, heat and ice which  provides moderate relief and chiropractic manipulation which buys no relief.  Patient does smoke marijuana she does not use tobacco or alcohol.  Prior pain medications include tramadol which did not help, naproxen currently which does not help, Flexeril in the past which does not help, duloxetine currently which does not help, gabapentin in the past which did not help and Lyrica in the past which did not help.    Review of Systems:    Review of Systems   Constitutional:  Negative for chills and fatigue.   HENT:  Negative for ear pain, mouth sores and sinus pressure.    Eyes:  Negative for pain, redness and visual disturbance.   Respiratory:  Negative for shortness of breath and wheezing.    Cardiovascular:  Negative for chest pain and palpitations.   Gastrointestinal:  Negative for abdominal pain and nausea.   Endocrine: Negative for polyphagia.   Musculoskeletal:  Positive for back pain, gait problem, joint swelling, neck pain and neck stiffness. Negative for arthralgias.        Decreased ROM, joint and muscle pain   Skin:  Negative for wound.   Neurological:  Positive for dizziness, weakness, numbness and headaches. Negative for seizures.   Psychiatric/Behavioral:  Positive for decreased concentration, dysphoric mood and sleep disturbance. The patient is nervous/anxious.            Past Medical History:   Diagnosis Date   • Asthma    • Lupus (HCC) 2021       Past Surgical History:   Procedure Laterality Date   • BREAST BIOPSY Bilateral     benign   • BREAST CYST EXCISION Right 2019    benign   • BREAST SURGERY     • CATARACT EXTRACTION Bilateral    •  SECTION     • RECTAL SURGERY         Family History   Adopted: Yes   Family history unknown: Yes       Social History     Occupational History   • Not on file   Tobacco Use   • Smoking status: Former     Current packs/day: 0.00     Types: Cigarettes     Quit date: 2011     Years since quittin.1   • Smokeless tobacco: Never   Vaping Use    • Vaping status: Never Used   Substance and Sexual Activity   • Alcohol use: Not Currently     Comment: socially   • Drug use: Yes     Types: Marijuana   • Sexual activity: Yes         Current Outpatient Medications:   •  albuterol (2.5 mg/3 mL) 0.083 % nebulizer solution, Take 3 mL (2.5 mg total) by nebulization every 6 (six) hours as needed for wheezing or shortness of breath, Disp: 75 mL, Rfl: 0  •  albuterol (PROVENTIL HFA,VENTOLIN HFA) 90 mcg/act inhaler, Inhale 2 puffs every 6 (six) hours as needed for shortness of breath Rinse/spit after use, Disp: 18 g, Rfl: 1  •  clobetasol (TEMOVATE) 0.05 % ointment, Apply topically 2 (two) times a day (Patient taking differently: Apply topically 2 (two) times a day As needed), Disp: 30 g, Rfl: 0  •  DULoxetine (CYMBALTA) 60 mg delayed release capsule, Take 1 capsule (60 mg total) by mouth daily, Disp: 30 capsule, Rfl: 1  •  Fluticasone-Salmeterol (Advair) 250-50 mcg/dose inhaler, INHALE 1 PUFF 2 TIMES A DAY RINSE MOUTH AFTER USE., Disp: 60 blister, Rfl: 0  •  hydrOXYzine HCL (ATARAX) 25 mg tablet, Take 1 tablet (25 mg total) by mouth every 6 (six) hours as needed for anxiety 1-2 po q6hrs prn anxiety/insomnia, Disp: 30 tablet, Rfl: 0  •  montelukast (SINGULAIR) 10 mg tablet, TAKE 1 TABLET BY MOUTH EVERY DAY, Disp: 90 tablet, Rfl: 1  •  multivitamin (THERAGRAN) TABS, Take 1 tablet by mouth daily, Disp: , Rfl:   •  naproxen (NAPROSYN) 500 mg tablet, TAKE 1 TABLET BY MOUTH TWICE A DAY WITH MEALS, Disp: 60 tablet, Rfl: 0  •  tiZANidine (ZANAFLEX) 2 mg tablet, Take 1 tablet (2 mg total) by mouth every 8 (eight) hours as needed for muscle spasms, Disp: 90 tablet, Rfl: 0  •  erythromycin (ILOTYCIN) ophthalmic ointment, Administer 0.5 inches to both eyes every 6 (six) hours (Patient not taking: Reported on 3/18/2024), Disp: 3.5 g, Rfl: 0    Allergies   Allergen Reactions   • Penicillins Shortness Of Breath   • Penicillins Hives and Shortness Of Breath   • Other      DOG   •  Pollen Extract        Physical Exam:    /81   Pulse 85   Wt 70.3 kg (155 lb)   LMP 03/11/2024 (Exact Date)   BMI 27.90 kg/m²     Constitutional: normal, well developed, well nourished, alert, in no distress and non-toxic and no overt pain behavior.  Eyes: anicteric  HEENT: grossly intact  Neck: supple, symmetric, trachea midline and no masses   Pulmonary:even and unlabored  Cardiovascular:No edema or pitting edema present  Skin:Normal without rashes or lesions and well hydrated  Psychiatric:Mood and affect appropriate  Neurologic:Cranial Nerves II-XII grossly intact  Musculoskeletal:normal gait. Pain with rotation of the cervical spine. TTP in midline cervical spine and bilateral cervical paraspinal muscles with taut bands appreciate and positive Jump sign.     Imaging  No orders to display       Orders Placed This Encounter   Procedures   • XR spine cervical 2 or 3 vw injury   • X-ray lumbar spine 2 or 3 views   • Ambulatory referral to Physical Therapy

## 2024-03-18 NOTE — TELEPHONE ENCOUNTER
Caller: Patient     Doctor: Pilo    Reason for call: Calling to find out if note was uploaded. Helped patient find it in [place]     Call back#: n/a

## 2024-03-22 ENCOUNTER — TELEPHONE (OUTPATIENT)
Age: 49
End: 2024-03-22

## 2024-03-22 NOTE — TELEPHONE ENCOUNTER
Caller: pt    Doctor: carmen    Reason for call: pt would like results of xrays.    Call back#: [phone]

## 2024-03-22 NOTE — TELEPHONE ENCOUNTER
Attempted to contact pt. [facility]. Provided with CB# and OH.    Xrays not final in Epic. Pt will be contacted when complete.

## 2024-03-25 ENCOUNTER — TELEPHONE (OUTPATIENT)
Age: 49
End: 2024-03-25

## 2024-03-25 NOTE — TELEPHONE ENCOUNTER
Caller: Tracey     Doctor: Pilo     Reason for call: Patient calling asking once results are in if she can get a call back. Patient also stated she has an appt with specialist and wanted Dr Daigle to know.    Call back#: [phone]

## 2024-03-25 NOTE — TELEPHONE ENCOUNTER
Jayla from Dr De La Torre office called to see if any records received from Dr Gonzalez. After review of chart no records found.

## 2024-03-25 NOTE — TELEPHONE ENCOUNTER
Attempted to contact RENNY MISHRA @ [phone] to request final imaging result, no one is available at this time. Will re-attempt later.

## 2024-03-26 ENCOUNTER — APPOINTMENT (OUTPATIENT)
Dept: LAB | Facility: HOSPITAL | Age: 49
End: 2024-03-26
Payer: COMMERCIAL

## 2024-03-26 DIAGNOSIS — M79.7 SCAPULOHUMERAL FIBROSITIS: ICD-10-CM

## 2024-03-26 DIAGNOSIS — R76.0 RAISED ANTIBODY TITER: ICD-10-CM

## 2024-03-26 LAB
25(OH)D3 SERPL-MCNC: 26.5 NG/ML (ref 30–100)
ALBUMIN SERPL BCP-MCNC: 4.2 G/DL (ref 3.5–5)
ALP SERPL-CCNC: 63 U/L (ref 34–104)
ALT SERPL W P-5'-P-CCNC: 26 U/L (ref 7–52)
ANION GAP SERPL CALCULATED.3IONS-SCNC: 6 MMOL/L (ref 4–13)
AST SERPL W P-5'-P-CCNC: 19 U/L (ref 13–39)
BASOPHILS # BLD AUTO: 0.07 THOUSANDS/ÂΜL (ref 0–0.1)
BASOPHILS NFR BLD AUTO: 1 % (ref 0–1)
BILIRUB SERPL-MCNC: 0.38 MG/DL (ref 0.2–1)
BUN SERPL-MCNC: 18 MG/DL (ref 5–25)
C3 SERPL-MCNC: 124 MG/DL (ref 87–200)
C4 SERPL-MCNC: 37 MG/DL (ref 19–52)
CALCIUM SERPL-MCNC: 9.3 MG/DL (ref 8.4–10.2)
CHLORIDE SERPL-SCNC: 103 MMOL/L (ref 96–108)
CO2 SERPL-SCNC: 27 MMOL/L (ref 21–32)
CREAT SERPL-MCNC: 0.71 MG/DL (ref 0.6–1.3)
CRP SERPL QL: <1 MG/L
EOSINOPHIL # BLD AUTO: 0.11 THOUSAND/ÂΜL (ref 0–0.61)
EOSINOPHIL NFR BLD AUTO: 2 % (ref 0–6)
ERYTHROCYTE [DISTWIDTH] IN BLOOD BY AUTOMATED COUNT: 12.9 % (ref 11.6–15.1)
ERYTHROCYTE [SEDIMENTATION RATE] IN BLOOD: 9 MM/HOUR (ref 0–19)
GFR SERPL CREATININE-BSD FRML MDRD: 100 ML/MIN/1.73SQ M
GLUCOSE P FAST SERPL-MCNC: 84 MG/DL (ref 65–99)
HCT VFR BLD AUTO: 43.1 % (ref 34.8–46.1)
HGB BLD-MCNC: 13.9 G/DL (ref 11.5–15.4)
IMM GRANULOCYTES # BLD AUTO: 0.01 THOUSAND/UL (ref 0–0.2)
IMM GRANULOCYTES NFR BLD AUTO: 0 % (ref 0–2)
LYMPHOCYTES # BLD AUTO: 2.08 THOUSANDS/ÂΜL (ref 0.6–4.47)
LYMPHOCYTES NFR BLD AUTO: 29 % (ref 14–44)
MCH RBC QN AUTO: 30.6 PG (ref 26.8–34.3)
MCHC RBC AUTO-ENTMCNC: 32.3 G/DL (ref 31.4–37.4)
MCV RBC AUTO: 95 FL (ref 82–98)
MONOCYTES # BLD AUTO: 0.51 THOUSAND/ÂΜL (ref 0.17–1.22)
MONOCYTES NFR BLD AUTO: 7 % (ref 4–12)
NEUTROPHILS # BLD AUTO: 4.3 THOUSANDS/ÂΜL (ref 1.85–7.62)
NEUTS SEG NFR BLD AUTO: 61 % (ref 43–75)
NRBC BLD AUTO-RTO: 0 /100 WBCS
PLATELET # BLD AUTO: 285 THOUSANDS/UL (ref 149–390)
PMV BLD AUTO: 10.7 FL (ref 8.9–12.7)
POTASSIUM SERPL-SCNC: 4.2 MMOL/L (ref 3.5–5.3)
PROT SERPL-MCNC: 7.1 G/DL (ref 6.4–8.4)
PTH-INTACT SERPL-MCNC: 35.3 PG/ML (ref 12–88)
RBC # BLD AUTO: 4.54 MILLION/UL (ref 3.81–5.12)
SODIUM SERPL-SCNC: 136 MMOL/L (ref 135–147)
TSH SERPL DL<=0.05 MIU/L-ACNC: 1.59 UIU/ML (ref 0.45–4.5)
VIT B12 SERPL-MCNC: 435 PG/ML (ref 180–914)
WBC # BLD AUTO: 7.08 THOUSAND/UL (ref 4.31–10.16)

## 2024-03-26 PROCEDURE — 85025 COMPLETE CBC W/AUTO DIFF WBC: CPT

## 2024-03-26 PROCEDURE — 85652 RBC SED RATE AUTOMATED: CPT

## 2024-03-26 PROCEDURE — 83970 ASSAY OF PARATHORMONE: CPT

## 2024-03-26 PROCEDURE — 86140 C-REACTIVE PROTEIN: CPT

## 2024-03-26 PROCEDURE — 86225 DNA ANTIBODY NATIVE: CPT

## 2024-03-26 PROCEDURE — 87522 HEPATITIS C REVRS TRNSCRPJ: CPT

## 2024-03-26 PROCEDURE — 84443 ASSAY THYROID STIM HORMONE: CPT

## 2024-03-26 PROCEDURE — 36415 COLL VENOUS BLD VENIPUNCTURE: CPT

## 2024-03-26 PROCEDURE — 86160 COMPLEMENT ANTIGEN: CPT

## 2024-03-26 PROCEDURE — 82306 VITAMIN D 25 HYDROXY: CPT

## 2024-03-26 PROCEDURE — 86235 NUCLEAR ANTIGEN ANTIBODY: CPT

## 2024-03-26 PROCEDURE — 87521 HEPATITIS C PROBE&RVRS TRNSC: CPT

## 2024-03-26 PROCEDURE — 80053 COMPREHEN METABOLIC PANEL: CPT

## 2024-03-26 PROCEDURE — 82607 VITAMIN B-12: CPT

## 2024-03-26 NOTE — TELEPHONE ENCOUNTER
S/w staff member in [place] xray ( # [phone]), states pt's xrays are marked to be read today. States if final results not available by 3/27, please cb to request results.  Please check for results on 3/27

## 2024-03-27 ENCOUNTER — EVALUATION (OUTPATIENT)
Dept: PHYSICAL THERAPY | Facility: CLINIC | Age: 49
End: 2024-03-27
Payer: COMMERCIAL

## 2024-03-27 DIAGNOSIS — G89.4 CHRONIC PAIN SYNDROME: ICD-10-CM

## 2024-03-27 DIAGNOSIS — G89.29 CHRONIC NECK PAIN: Primary | ICD-10-CM

## 2024-03-27 DIAGNOSIS — M54.2 CHRONIC NECK PAIN: Primary | ICD-10-CM

## 2024-03-27 LAB
DSDNA AB SER-ACNC: 1 IU/ML (ref 0–9)
ENA JO1 AB SER-ACNC: <0.2 AI (ref 0–0.9)
ENA RNP AB SER-ACNC: <0.2 AI (ref 0–0.9)
ENA SM AB SER-ACNC: <0.2 AI (ref 0–0.9)
ENA SS-A AB SER-ACNC: <0.2 AI (ref 0–0.9)
ENA SS-B AB SER-ACNC: <0.2 AI (ref 0–0.9)
HCV RNA SERPL NAA+PROBE-ACNC: NOT DETECTED K[IU]/ML

## 2024-03-27 PROCEDURE — 97161 PT EVAL LOW COMPLEX 20 MIN: CPT
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
## 2024-03-27 NOTE — TELEPHONE ENCOUNTER
Xray of the cervical spine shows spondylitic changes with disc space narrowing greatest between C4 through C7. Xray lumbar shows degenerative disc space narrowing at multiple levels, greatest at L4-5

## 2024-04-01 ENCOUNTER — TELEPHONE (OUTPATIENT)
Dept: PAIN MEDICINE | Facility: CLINIC | Age: 49
End: 2024-04-01

## 2024-04-01 ENCOUNTER — OFFICE VISIT (OUTPATIENT)
Dept: PHYSICAL THERAPY | Facility: CLINIC | Age: 49
End: 2024-04-01
Payer: COMMERCIAL

## 2024-04-01 DIAGNOSIS — G89.29 CHRONIC NECK PAIN: Primary | ICD-10-CM

## 2024-04-01 DIAGNOSIS — G89.4 CHRONIC PAIN SYNDROME: ICD-10-CM

## 2024-04-01 DIAGNOSIS — M54.2 CHRONIC NECK PAIN: Primary | ICD-10-CM

## 2024-04-01 PROCEDURE — 97140 MANUAL THERAPY 1/> REGIONS: CPT

## 2024-04-01 PROCEDURE — 97112 NEUROMUSCULAR REEDUCATION: CPT

## 2024-04-01 NOTE — TELEPHONE ENCOUNTER
----- Message from Kin Daigle MD sent at 4/1/2024 10:12 AM EDT -----  Mild lumbar DDD. No change in management at this time.

## 2024-04-01 NOTE — TELEPHONE ENCOUNTER
----- Message from Kin Daigle MD sent at 4/1/2024 10:12 AM EDT -----  Patient with arthritis changes, worse between C4-C7. No change in management at this time.

## 2024-04-01 NOTE — PROGRESS NOTES
Daily Note     Today's date: 2024  Patient name: Tracey Vernon  : 1975  MRN: [id number]  Referring provider: Kin Daigle MD  Dx:   Encounter Diagnosis     ICD-10-CM    1. Chronic neck pain  M54.2     G89.29       2. Chronic pain syndrome  G89.4                      Subjective: Patient reports 6/10 pain today with radiating pain into her Rt arm. She has been adherent to HEP.      Objective: See treatment diary below      Assessment: Tolerated treatment fair. Initiated basic cervical and thoracic mobility activities focusing on generalized movement. Patient had difficulty with Lt rotation during open books secondary to pain in her Lt arm. Discussed pain group again which patient was amenable to and will begin on Friday. Patient demonstrated fatigue post treatment and would benefit from continued PT to address functional mobility deficits and educate on pain neuroscience in order to improve function and return to PLOF.      Plan: Continue per plan of care. Begin pain group on .     Insurance:  AMA/CMS Eval/ Re-eval Auth #/ Referral # Total units  Start date  Expiration date Extension  Visit limitation?  PT only or  PT+OT? Co-Insurance   CMS ([facility]) 3/27/2024 Auth needed                                                            POC Start Date POC Expiration Date Signed POC?   3/27/2024 6 weeks - 2024       Date               Units:  Used               Authed:  Remaining                  Precautions:   Past Medical History:   Diagnosis Date    Asthma     Lupus (HCC) 2021       Date 3/27/2024 4/1/24       Visit Number IE 2       FOTO Tracking Intake Survey     Follow-up #1   Manual         Cervical mobs  Distraction x 5 min    Central gliders 3x30 sec       Cervical STM  SOR x 5 min       Thoracic mobs                           TherEx         Thoracic ext   Over 1/2 foam x20                                                                      Neuro Re-Ed         [facility] Cervical  retractions 5 x 10 throughout the day  Supine x10       DNF lift  10 x 5 sec        Postural strengthening         Open books  X10 each       SOC   x10                                  TherAct         Patient education HEP and POC x 10 min PNE group x 8 min                                           Gait Training                                    Modalities         Heat

## 2024-04-03 ENCOUNTER — APPOINTMENT (OUTPATIENT)
Dept: PHYSICAL THERAPY | Facility: CLINIC | Age: 49
End: 2024-04-03
Payer: COMMERCIAL

## 2024-04-03 DIAGNOSIS — R92.8 ABNORMAL MAMMOGRAM: Primary | ICD-10-CM

## 2024-04-03 NOTE — TELEPHONE ENCOUNTER
Caller: ari Webb    Doctor: Pilo    Reason for call: pt returning nurses call    Call back#: [phone]

## 2024-04-05 ENCOUNTER — OFFICE VISIT (OUTPATIENT)
Dept: PHYSICAL THERAPY | Facility: CLINIC | Age: 49
End: 2024-04-05
Payer: COMMERCIAL

## 2024-04-05 ENCOUNTER — APPOINTMENT (OUTPATIENT)
Dept: PHYSICAL THERAPY | Facility: CLINIC | Age: 49
End: 2024-04-05
Payer: COMMERCIAL

## 2024-04-05 DIAGNOSIS — M54.2 CHRONIC NECK PAIN: Primary | ICD-10-CM

## 2024-04-05 DIAGNOSIS — G89.4 CHRONIC PAIN SYNDROME: ICD-10-CM

## 2024-04-05 DIAGNOSIS — G89.29 CHRONIC NECK PAIN: Primary | ICD-10-CM

## 2024-04-05 PROCEDURE — 97110 THERAPEUTIC EXERCISES: CPT

## 2024-04-05 PROCEDURE — 97112 NEUROMUSCULAR REEDUCATION: CPT

## 2024-04-05 PROCEDURE — 97530 THERAPEUTIC ACTIVITIES: CPT

## 2024-04-05 NOTE — PROGRESS NOTES
"Daily Note     Today's date: 2024  Patient name: Tracey Vernon  : 1975  MRN: [id number]  Referring provider: Kin Daigle MD  Dx:   Encounter Diagnosis     ICD-10-CM    1. Chronic neck pain  M54.2     G89.29       2. Chronic pain syndrome  G89.4           Start Time: 0930  Stop Time: 1050  Total time in clinic (min): 80 minutes    Subjective: \"Everything hurts.\"      Objective: See treatment diary below    Neuro Re-Ed:  Week 2: Patient educated regarding spreading pain symptoms, and that feeling pain in adjacent areas of the body does not indicate definite tissue injury. Hyperalgesia, immune responses and central sensitization topics were introduced using metaphors to promote deep learning. Reviewed topic of diet and how certain foods can be inflammatory, resulting in more pain, in certain individuals.       TherEx:  Aerobic Warm-Up: 10 min on TM    Circuit 1: 60\" on / 30\" off, 2 rounds   - Sit to stands   - Lateral stepping   - Step ups   - Biceps curls    TherAct:  PROMIS Scores (short forms):  Managing Symptoms:   Managing Daily Activities:   Pain Interference:     Pain Catastrophizing Scale: 52/52      Assessment: Tolerated treatment well. Patient introduced to the topic of pain neuroscience education and that improving knowledge of how pain works promotes improved recovery and rehab. Current knowledge and understanding of patient on pain related topics was explored to create baseline. Patient educated on the concept of the nervous system as the bodies alarm system, and the role of nociception to warn body of danger. Peripheral nerve sensitization, hyperalgesia, and allodynia were explained using metaphors to promote deep learning. Patient encouraged to identify personal yellow flags, and explore/identify activity limitations as a result of  nervous system hypersensitivity prior to next visit. Patient demo high motivation for participation with exercise during today's session. " Patient will continue to benefit from skilled PT to improve functional mobility and fear avoidant behaviors.      Plan: Continue per plan of care.      Insurance:  AMA/CMS Eval/ Re-eval Auth #/ Referral # Total units  Start date  Expiration date Extension  Visit limitation?  PT only or  PT+OT? Co-Insurance   CMS ([facility]) 3/27/2024 Auth needed                                                            POC Start Date POC Expiration Date Signed POC?   3/27/2024 6 weeks - 5/8/2024       Date               Units:  Used               Authed:  Remaining                  Precautions:   Past Medical History:   Diagnosis Date    Asthma     Lupus (HCC) 05/03/2021       Date 3/27/2024 4/1/24 4/5/24      Visit Number IE 2 3      FOTO Tracking Intake Survey  As noted above   Follow-up #1   Manual         Cervical mobs  Distraction x 5 min    Central gliders 3x30 sec       Cervical STM  SOR x 5 min       Thoracic mobs                           TherEx         Thoracic ext   Over 1/2 foam x20                                                                      Neuro Re-Ed         Chin tuck Cervical retractions 5 x 10 throughout the day  Supine x10       DNF lift  10 x 5 sec        Postural strengthening         Open books  X10 each       SOC   x10                                  TherAct         Patient education HEP and POC x 10 min PNE group x 8 min                                           Gait Training                                    Modalities         Heat

## 2024-04-09 DIAGNOSIS — M54.2 CHRONIC NECK PAIN: ICD-10-CM

## 2024-04-09 DIAGNOSIS — M79.7 FIBROMYALGIA: ICD-10-CM

## 2024-04-09 DIAGNOSIS — G89.29 CHRONIC NECK PAIN: ICD-10-CM

## 2024-04-09 RX ORDER — DULOXETIN HYDROCHLORIDE 60 MG/1
60 CAPSULE, DELAYED RELEASE ORAL DAILY
Qty: 90 CAPSULE | Refills: 1 | Status: SHIPPED | OUTPATIENT
Start: 2024-04-09

## 2024-04-09 RX ORDER — TIZANIDINE 2 MG/1
2 TABLET ORAL EVERY 8 HOURS PRN
Qty: 270 TABLET | Refills: 1 | Status: SHIPPED | OUTPATIENT
Start: 2024-04-09

## 2024-04-10 DIAGNOSIS — J45.40 MODERATE PERSISTENT ASTHMA WITHOUT COMPLICATION: ICD-10-CM

## 2024-04-10 RX ORDER — FLUTICASONE PROPIONATE AND SALMETEROL 250; 50 UG/1; UG/1
POWDER RESPIRATORY (INHALATION)
Qty: 60 BLISTER | Refills: 5 | Status: SHIPPED | OUTPATIENT
Start: 2024-04-10

## 2024-04-12 ENCOUNTER — OFFICE VISIT (OUTPATIENT)
Dept: PHYSICAL THERAPY | Facility: CLINIC | Age: 49
End: 2024-04-12
Payer: COMMERCIAL

## 2024-04-12 DIAGNOSIS — G89.29 CHRONIC NECK PAIN: Primary | ICD-10-CM

## 2024-04-12 DIAGNOSIS — M54.2 CHRONIC NECK PAIN: Primary | ICD-10-CM

## 2024-04-12 DIAGNOSIS — G89.4 CHRONIC PAIN SYNDROME: ICD-10-CM

## 2024-04-12 DIAGNOSIS — M25.50 DIFFUSE ARTHRALGIA: ICD-10-CM

## 2024-04-12 PROCEDURE — 97112 NEUROMUSCULAR REEDUCATION: CPT

## 2024-04-12 RX ORDER — NAPROXEN 500 MG/1
500 TABLET ORAL 2 TIMES DAILY WITH MEALS
Qty: 60 TABLET | Refills: 0 | Status: SHIPPED | OUTPATIENT
Start: 2024-04-12

## 2024-04-12 NOTE — PROGRESS NOTES
Daily Note     Today's date: 2024  Patient name: Tracey Vernon  : 1975  MRN: [id number]  Referring provider: Kin Daigle MD  Dx:   Encounter Diagnosis     ICD-10-CM    1. Chronic neck pain  M54.2     G89.29       2. Chronic pain syndrome  G89.4           Start Time: 0930  Stop Time: 1050  Total time in clinic (min): 80 minutes    Subjective: Patient denies changes since previous session. States she finds it challenging to mitigate her pain levels. States she is excited to learn more about pain.      Objective: See treatment diary below    Neuro Re-Ed:  Week 3: Patient was educated regarding endogenous mechanisms and strategies to increase the brain's production of chemicals which decrease pain, such as aerobic exercise and improved pain knowledge. The concepts of pacing, graded exposure, 'sore but safe' and 'hurt does not equal harm' were discussed. Educated patient on guided practice and potential benefits of progressive muscle relaxation (PMR) for chronic pain, stress management, and sleep.      TherEx:  Aerobic Warm-Up: 10 min on TM      Assessment: Tolerated treatment well. Patient demo improving pain knowledge with fair carryover of topics covered in previous session. Pt demo high motivation to participate in session. Cont w/ ambulation in TM to promote sympathetic down regulation. Patient will continue to benefit from skilled PT to improve functional mobility and activity tolerance.      Plan: Continue per plan of care.      Insurance:  AMA/CMS Eval/ Re-eval Auth #/ Referral # Total units  Start date  Expiration date Extension  Visit limitation?  PT only or  PT+OT? Co-Insurance   CMS ([facility] [place]) 3/27/2024 Auth needed                                                            POC Start Date POC Expiration Date Signed POC?   3/27/2024 6 weeks - 2024       Date               Units:  Used               Authed:  Remaining                  Precautions:   Past Medical History:   Diagnosis Date     Asthma     Lupus (HCC) 05/03/2021       Date 3/27/2024 4/1/24 4/5/24 4/12/24     Visit Number IE 2 3 4     FOTO Tracking Intake Survey  As noted above As noted above  Follow-up #1   Manual         Cervical mobs  Distraction x 5 min    Central gliders 3x30 sec       Cervical STM  SOR x 5 min       Thoracic mobs                           TherEx         Thoracic ext   Over 1/2 foam x20                                                                      Neuro Re-Ed         Chin tuck Cervical retractions 5 x 10 throughout the day  Supine x10       DNF lift  10 x 5 sec        Postural strengthening         Open books  X10 each       SOC   x10                                  TherAct         Patient education HEP and POC x 10 min PNE group x 8 min                                           Gait Training                                    Modalities         Heat

## 2024-04-16 ENCOUNTER — OFFICE VISIT (OUTPATIENT)
Dept: PHYSICAL THERAPY | Facility: CLINIC | Age: 49
End: 2024-04-16
Payer: COMMERCIAL

## 2024-04-16 DIAGNOSIS — G89.29 CHRONIC NECK PAIN: Primary | ICD-10-CM

## 2024-04-16 DIAGNOSIS — G89.4 CHRONIC PAIN SYNDROME: ICD-10-CM

## 2024-04-16 DIAGNOSIS — M54.2 CHRONIC NECK PAIN: Primary | ICD-10-CM

## 2024-04-16 PROCEDURE — 97112 NEUROMUSCULAR REEDUCATION: CPT

## 2024-04-16 PROCEDURE — 97140 MANUAL THERAPY 1/> REGIONS: CPT

## 2024-04-16 PROCEDURE — 97110 THERAPEUTIC EXERCISES: CPT

## 2024-04-16 NOTE — PROGRESS NOTES
Daily Note     Today's date: 2024  Patient name: Tracey Vernon  : 1975  MRN: [id number]  Referring provider: Kin Daigle MD  Dx:   Encounter Diagnosis     ICD-10-CM    1. Chronic neck pain  M54.2     G89.29       2. Chronic pain syndrome  G89.4                      Subjective: Patient reports that she is learning a lot from the pain group, but it has not changed her physical symptoms.      Objective: See treatment diary below      Assessment: Tolerated treatment fair. Continued with attempts to improve cervical and thoracic mobility to patient tolerance. Attempted NS for sympathetic downregulation with patient being unable to complete secondary to sharp lumbar pain. Patient ended session at that point. Discussed importance of utilizing concepts from the pain group throughout when pain intensity is limiting her participation in activities. Patient demonstrated fatigue post treatment and would benefit from continued PT at address functional mobility deficits and return to PLOF.      Plan: Continue per plan of care.      Insurance:  AMA/CMS Eval/ Re-eval Auth #/ Referral # Total units  Start date  Expiration date Extension  Visit limitation?  PT only or  PT+OT? Co-Insurance   CMS ([employer]) 3/27/2024 Auth needed                                                            POC Start Date POC Expiration Date Signed POC?   3/27/2024 6 weeks - 2024       Date               Units:  Used               Authed:  Remaining                  Precautions:   Past Medical History:   Diagnosis Date    Asthma     Lupus (HCC) 2021       Date 3/27/2024 4/1/24 4/5/24 4/12/24 4/16/24    Visit Number IE 2 3 4 5    FOTO Tracking Intake Survey  As noted above As noted above  Follow-up #1   Manual         Cervical mobs  Distraction x 5 min    Central gliders 3x30 sec   Distraction x 3 min    Cervical STM  SOR x 5 min   SOR x 2 min    Thoracic mobs                           TherEx         Thoracic ext   Over   foam x20   Over 1/2 foam x20    NS     Trial x 2 min                                                          Neuro Re-Ed         Chin tuck Cervical retractions 5 x 10 throughout the day  Supine x10 Supine x 15   Supine x15    DNF lift  10 x 5 sec        Postural strengthening         Open books  X10 each X15 each  X15 each    SOC   x10 x20  x20                               TherAct         Patient education HEP and POC x 10 min PNE group x 8 min                                           Gait Training                                    Modalities         Heat

## 2024-04-19 ENCOUNTER — OFFICE VISIT (OUTPATIENT)
Dept: PHYSICAL THERAPY | Facility: CLINIC | Age: 49
End: 2024-04-19
Payer: COMMERCIAL

## 2024-04-19 DIAGNOSIS — M54.2 CHRONIC NECK PAIN: Primary | ICD-10-CM

## 2024-04-19 DIAGNOSIS — G89.29 CHRONIC NECK PAIN: Primary | ICD-10-CM

## 2024-04-19 DIAGNOSIS — G89.4 CHRONIC PAIN SYNDROME: ICD-10-CM

## 2024-04-19 PROCEDURE — 97112 NEUROMUSCULAR REEDUCATION: CPT

## 2024-04-19 PROCEDURE — 97110 THERAPEUTIC EXERCISES: CPT

## 2024-04-19 NOTE — PROGRESS NOTES
"Daily Note     Today's date: 2024  Patient name: Tracey Vernon  : 1975  MRN: [id number]  Referring provider: Kin Daigle MD  Dx:   Encounter Diagnosis     ICD-10-CM    1. Chronic neck pain  M54.2     G89.29       2. Chronic pain syndrome  G89.4           Start Time: 0930  Stop Time: 1050  Total time in clinic (min): 80 minutes    Subjective: Patient reports continued pain in multiple joints since previous session. States she has noticed frequency of negative thoughts more often.      Objective: See treatment diary below    Neuro Re-Ed:  Week 4: Patient educated on the pain neuromatrix or pain map via fMRI examples, and how various areas of the brain are involved in pain experience. These areas have alternative primary focus which is disturbed when the brain is producing pain (memory, focus, concentration, emotion, fine motor control, temperature), causing potential patient issues/struggles. Patient educated on importance of graded introduction to activity, including review of \"hurt does not equal harm\" when discussing progression of aerobic and functional activity progressions, pt expressed good understanding. Patient encouraged to journal body issues experienced as a result of pain, and problem solve strategies discussed at today's session which can improve \"pain maps\".       TherEx:  Aerobic Warm-Up: 10 min on treadmill      Assessment: Tolerated treatment well. Heavily reviewed PNE principles covered previously to assess carryover and application. Reviewed positivity ratios and introduced topics of automatic negative thoughts as possible contributing factors to yellow flags and pain. Continued with aerobic conditioning to promote sympathetic down regulation. Patient will continue to benefit from skilled PT to improve functional mobility and activity tolerance.      Plan: Continue per plan of care.      Insurance:  AMA/CMS Eval/ Re-eval Auth #/ Referral # Total units  Start date  Expiration date " Extension  Visit limitation?  PT only or  PT+OT? Co-Insurance   CMS ([facility]) 3/27/2024 Auth needed                                                            POC Start Date POC Expiration Date Signed POC?   3/27/2024 6 weeks - 5/8/2024       Date               Units:  Used               Authed:  Remaining                  Precautions:   Past Medical History:   Diagnosis Date    Asthma     Lupus (HCC) 05/03/2021       Date 3/27/2024 4/1/24 4/5/24 4/12/24 4/16/24 4/19/24   Visit Number IE 2 3 4 5 6   FOTO Tracking Intake Survey  As noted above As noted above  Follow-up #1   Manual      As noted above   Cervical mobs  Distraction x 5 min    Central gliders 3x30 sec   Distraction x 3 min    Cervical STM  SOR x 5 min   SOR x 2 min    Thoracic mobs                           TherEx         Thoracic ext   Over 1/2 foam x20   Over 1/2 foam x20    NS     Trial x 2 min                                                          Neuro Re-Ed         Chin tuck Cervical retractions 5 x 10 throughout the day  Supine x10 Supine x 15   Supine x15    DNF lift  10 x 5 sec        Postural strengthening         Open books  X10 each X15 each  X15 each    SOC   x10 x20  x20                               TherAct         Patient education HEP and POC x 10 min PNE group x 8 min                                           Gait Training                                    Modalities         Heat

## 2024-04-29 ENCOUNTER — OFFICE VISIT (OUTPATIENT)
Dept: PHYSICAL THERAPY | Facility: CLINIC | Age: 49
End: 2024-04-29
Payer: COMMERCIAL

## 2024-04-29 DIAGNOSIS — M54.2 CHRONIC NECK PAIN: Primary | ICD-10-CM

## 2024-04-29 DIAGNOSIS — G89.29 CHRONIC NECK PAIN: Primary | ICD-10-CM

## 2024-04-29 DIAGNOSIS — G89.4 CHRONIC PAIN SYNDROME: ICD-10-CM

## 2024-04-29 PROCEDURE — 97112 NEUROMUSCULAR REEDUCATION: CPT

## 2024-04-29 NOTE — PROGRESS NOTES
Daily Note     Today's date: 2024  Patient name: Tracey Vernon  : 1975  MRN: [id number]  Referring provider: Kin Daigle MD  Dx:   Encounter Diagnosis     ICD-10-CM    1. Chronic neck pain  M54.2     G89.29       2. Chronic pain syndrome  G89.4                      Subjective: Patient reports that she had a fall last weekend which resulted in her landing on her Lt side and injuring her Rt low back.       Objective: See treatment diary below      Assessment: Tolerated treatment well. Continued with established POC emphasizing graded exposure to cervical and thoracic mobility as well as gradual strengthening of postural muscles. Patient was able to tolerate additions well today with only slight modification secondary to production of Rt sided LBP. Patient will continue to benefit from skilled PT to improve functional mobility and activity tolerance.      Plan: Continue per plan of care.        Insurance:  AMA/CMS Eval/ Re-eval Auth #/ Referral # Total units  Start date  Expiration date Extension  Visit limitation?  PT only or  PT+OT? Co-Insurance   CMS ([employer]) 3/27/2024 Auth needed                                                            POC Start Date POC Expiration Date Signed POC?   3/27/2024 6 weeks - 2024       Date               Units:  Used               Authed:  Remaining                  Precautions:   Past Medical History:   Diagnosis Date    Asthma     Lupus (HCC) 2021       Date 24   Visit Number 2 3 4 5 6 7   FOTO Tracking  As noted above As noted above  Follow-up #1    Manual     As noted above    Cervical mobs Distraction x 5 min    Central gliders 3x30 sec   Distraction x 3 min  Distraction x 3 min   Cervical STM SOR x 5 min   SOR x 2 min  SOR x 2 min   Thoracic mobs                           TherEx         Thoracic ext  Over 1/2 foam x20   Over 1/2 foam x20  Over chair x20   NS    Trial x 2 min                                                            Neuro Re-Ed         Chin tuck Supine x10 Supine x 15   Supine x15  Supine x15   DNF lift 10 x 5 sec         Postural strengthening      Rows and extensions RTB 2x10   Open books X10 each X15 each  X15 each  X15 each   SOC  x10 x20  x20     Finger ladder      X10 w/ eccentric lower each   Ball on wall      A-Z x1 each (unable with Rt)            TherAct         Patient education PNE group x 8 min                                            Gait Training                                    Modalities         Heat

## 2024-05-05 DIAGNOSIS — M25.50 DIFFUSE ARTHRALGIA: ICD-10-CM

## 2024-05-05 RX ORDER — NAPROXEN 500 MG/1
500 TABLET ORAL 2 TIMES DAILY WITH MEALS
Qty: 60 TABLET | Refills: 0 | Status: SHIPPED | OUTPATIENT
Start: 2024-05-05

## 2024-05-10 ENCOUNTER — OFFICE VISIT (OUTPATIENT)
Dept: PHYSICAL THERAPY | Facility: CLINIC | Age: 49
End: 2024-05-10
Payer: COMMERCIAL

## 2024-05-10 DIAGNOSIS — G89.29 CHRONIC NECK PAIN: Primary | ICD-10-CM

## 2024-05-10 DIAGNOSIS — J45.40 MODERATE PERSISTENT ASTHMA WITHOUT COMPLICATION: ICD-10-CM

## 2024-05-10 DIAGNOSIS — G89.4 CHRONIC PAIN SYNDROME: ICD-10-CM

## 2024-05-10 DIAGNOSIS — M54.2 CHRONIC NECK PAIN: Primary | ICD-10-CM

## 2024-05-10 PROCEDURE — 97112 NEUROMUSCULAR REEDUCATION: CPT

## 2024-05-10 RX ORDER — ALBUTEROL SULFATE 90 UG/1
2 AEROSOL, METERED RESPIRATORY (INHALATION) EVERY 6 HOURS PRN
Qty: 17 G | Refills: 1 | Status: SHIPPED | OUTPATIENT
Start: 2024-05-10

## 2024-05-10 NOTE — PROGRESS NOTES
"Daily Note     Today's date: 5/10/2024  Patient name: Tracey Vernon  : 1975  MRN: [id number]  Referring provider: Kin Daigle MD  Dx:   Encounter Diagnosis     ICD-10-CM    1. Chronic neck pain  M54.2     G89.29       2. Chronic pain syndrome  G89.4           Start Time: 0930  Stop Time: 1055  Total time in clinic (min): 85 minutes    Subjective: \"I've been in a funk recently.\"      Objective: See treatment diary below    Neuro Re-Ed:  Week 7: Patient educated regarding the role of the primary somatosensory cortex in pain and body maps (which depend on movement and use of the body parts) and that for people with persistent pain, decreased use of the body and other biologic processes change the body maps. Laterality proficiency was discussed and addressed. Patient educated on proper lifting mechanics, will benefit from additional follow up in future sessions to review technique.      Sympathetic down regulation: 10 min on TM      Assessment: Tolerated treatment well. Discussed pain vs function graph, as well as strong correlation between yellow flags and pain during today's session. Patient expressed improved motivation post session. Continued with aerobic conditioning to promote sympathetic down regulation. Patient will continue to benefit from skilled PT to improve functional mobility and activity tolerance.      Plan: Continue per plan of care.      Insurance:  AMA/CMS Eval/ Re-eval Auth #/ Referral # Total units  Start date  Expiration date Extension  Visit limitation?  PT only or  PT+OT? Co-Insurance   CMS ([facility]) 3/27/2024 Auth needed                                                            POC Start Date POC Expiration Date Signed POC?   3/27/2024 6 weeks - 2024       Date               Units:  Used               Authed:  Remaining                  Precautions:   Past Medical History:   Diagnosis Date    Asthma     Lupus (HCC) 2021       Date 24 " 5/10/24   Visit Number 3 4 5 6 7 8   FOTO Tracking As noted above As noted above  Follow-up #1  As noted above   Manual    As noted above     Cervical mobs   Distraction x 3 min  Distraction x 3 min    Cervical STM   SOR x 2 min  SOR x 2 min    Thoracic mobs                           TherEx         Thoracic ext    Over 1/2 foam x20  Over chair x20    NS   Trial x 2 min                                                            Neuro Re-Ed         Chin tuck Supine x 15   Supine x15  Supine x15    DNF lift         Postural strengthening     Rows and extensions RTB 2x10    Open books X15 each  X15 each  X15 each    SOC  x20  x20      Finger ladder     X10 w/ eccentric lower each    Ball on wall     A-Z x1 each (unable with Rt)             TherAct         Patient education                                             Gait Training                                    Modalities         Heat

## 2024-05-13 ENCOUNTER — OFFICE VISIT (OUTPATIENT)
Dept: PHYSICAL THERAPY | Facility: CLINIC | Age: 49
End: 2024-05-13
Payer: COMMERCIAL

## 2024-05-13 DIAGNOSIS — G89.4 CHRONIC PAIN SYNDROME: ICD-10-CM

## 2024-05-13 DIAGNOSIS — G89.29 CHRONIC NECK PAIN: Primary | ICD-10-CM

## 2024-05-13 DIAGNOSIS — M54.2 CHRONIC NECK PAIN: Primary | ICD-10-CM

## 2024-05-13 PROCEDURE — 97110 THERAPEUTIC EXERCISES: CPT

## 2024-05-13 PROCEDURE — 97112 NEUROMUSCULAR REEDUCATION: CPT

## 2024-05-13 NOTE — PROGRESS NOTES
"Daily Note     Today's date: 2024  Patient name: Tracey Vernon  : 1975  MRN: [id number]  Referring provider: Kin Daigle MD  Dx:   Encounter Diagnosis     ICD-10-CM    1. Chronic neck pain  M54.2     G89.29       2. Chronic pain syndrome  G89.4           Start Time: 925  Stop Time: 1005  Total time in clinic (min): 40 minutes    Subjective: \"I feel okay. I feel a little down.\"      Objective: See treatment diary below      Assessment: Tolerated treatment well. Heavily reviewed \"sore but safe\" and \"hurt does not equal harm\" principles with patient during today's session. Reviewed DOMS principles for 24-48 hours post session. Patient verbalized good understanding/agreement with aforementioned education. Continued emphasis placed on mobility and postural retraining during today's session w/ quick fatigue ntoed. Patient will continue to benefit from skilled PT to improve functional mobility and activity tolerance.       Plan: Continue per plan of care.      Insurance:  AMA/CMS Eval/ Re-eval Auth #/ Referral # Total units  Start date  Expiration date Extension  Visit limitation?  PT only or  PT+OT? Co-Insurance   CMS ([employer]) 3/27/2024 Auth needed                                                            POC Start Date POC Expiration Date Signed POC?   3/27/2024 6 weeks - 2024       Date               Units:  Used               Authed:  Remaining                  Precautions:   Past Medical History:   Diagnosis Date    Asthma     Lupus (HCC) 2021       Date 4/12/24 4/16/24 4/19/24 4/29/24 5/10/24 5/13/24   Visit Number 4 5 6 7 8 9   FOTO Tracking As noted above  Follow-up #1  As noted above    Manual   As noted above      Cervical mobs  Distraction x 3 min  Distraction x 3 min     Cervical STM  SOR x 2 min  SOR x 2 min     Thoracic mobs                           TherEx         Thoracic ext   Over 1/2 foam x20  Over chair x20  Prone CKC scap retraction: 5\" x20   NS  Trial x 2 min     " "  Wall slides      With pillow case: x20                                                Neuro Re-Ed         Chin tuck  Supine x15  Supine x15  5\" x15   DNF lift         Postural strengthening    Rows and extensions RTB 2x10  Rows: 4# at cc, 3x10    Shld ext: 4# at cc, 3x10   Open books  X15 each  X15 each  5\" x15 each   SOC   x20       Finger ladder    X10 w/ eccentric lower each     Ball on wall    A-Z x1 each (unable with Rt)              TherAct         Patient education                                             Gait Training                                    Modalities         Heat                               "

## 2024-05-17 ENCOUNTER — OFFICE VISIT (OUTPATIENT)
Dept: PHYSICAL THERAPY | Facility: CLINIC | Age: 49
End: 2024-05-17
Payer: COMMERCIAL

## 2024-05-17 DIAGNOSIS — G89.4 CHRONIC PAIN SYNDROME: ICD-10-CM

## 2024-05-17 DIAGNOSIS — M54.2 CHRONIC NECK PAIN: Primary | ICD-10-CM

## 2024-05-17 DIAGNOSIS — G89.29 CHRONIC NECK PAIN: Primary | ICD-10-CM

## 2024-05-17 PROCEDURE — 97112 NEUROMUSCULAR REEDUCATION: CPT

## 2024-05-17 NOTE — PROGRESS NOTES
"Daily Note     Today's date: 2024  Patient name: Tracey Vernon  : 1975  MRN: [id number]  Referring provider: Kin Daigle MD  Dx:   Encounter Diagnosis     ICD-10-CM    1. Chronic neck pain  M54.2     G89.29       2. Chronic pain syndrome  G89.4           Start Time: 0930  Stop Time: 1050  Total time in clinic (min): 80 minutes    Subjective: \"I didn't spend as much time in bed this past week, so I'm feeling a little better because of that.\"      Objective: See treatment diary below    Neuro Re-Ed:  Week 8: Patient educated on the relationship between emotions and pain, and the role that fear, catastrophization, nociception and treat play in persistent pain, by activating the body's alarm system, resulting in hypersensitive nerves. Metaphors incorporated to foster deep learning and paradigm shift for patient. Provided with resources regarding alternative medicines, including natural herbs, massage, and acupuncture, expressed good understanding. Reviewed functional limitations and timeline of events following increased stress prior to previous session. Discussed definition of positive affect and effects on pain, sleep, physical function, stress. Reviewed importance of how participating in positive activities, rather than just reflecting on them, reduces pain and catastrophizing and improves mood. Examples of positive activities that are shown to be most beneficial in promoting happiness were reviewed. Pt given homework to participate in at least 1 positive activity this week, and record what s/he did and how they felt before and after.      TherEx:  Aerobic Warm-Up: 10 min on TM      Assessment: Tolerated treatment well. Patient demo improving motivation for participation throughout today's session. Pt demo improving understanding of PNE principles covered during today's session. Heavy emphasis placed on reframing pain and focus on function graph. Cont w/ TM for sympathetic down regulation. Patient " "will continue to benefit from skilled PT to improve functional mobility and activity tolerance.      Plan: Continue per plan of care.  Re-eval nv.     Insurance:  AMA/CMS Eval/ Re-eval Auth #/ Referral # Total units  Start date  Expiration date Extension  Visit limitation?  PT only or  PT+OT? Co-Insurance   CMS ([facility]) 3/27/2024 Auth needed                                                            POC Start Date POC Expiration Date Signed POC?   3/27/2024 6 weeks - 5/8/2024       Date               Units:  Used               Authed:  Remaining                  Precautions:   Past Medical History:   Diagnosis Date    Asthma     Lupus (HCC) 05/03/2021       Date 4/16/24 4/19/24 4/29/24 5/10/24 5/13/24 5/17/24   Visit Number 5 6 7 8 9 10   FOTO Tracking  Follow-up #1  As noted above  As noted above   Manual  As noted above    Re-eval nv   Cervical mobs Distraction x 3 min  Distraction x 3 min      Cervical STM SOR x 2 min  SOR x 2 min      Thoracic mobs                           TherEx         Thoracic ext  Over 1/2 foam x20  Over chair x20  Prone CKC scap retraction: 5\" x20    NS Trial x 2 min        Wall slides     With pillow case: x20                                                 Neuro Re-Ed         Chin tuck Supine x15  Supine x15  5\" x15    DNF lift         Postural strengthening   Rows and extensions RTB 2x10  Rows: 4# at cc, 3x10    Shld ext: 4# at cc, 3x10    Open books X15 each  X15 each  5\" x15 each    SOC  x20        Finger ladder   X10 w/ eccentric lower each      Ball on wall   A-Z x1 each (unable with Rt)               TherAct         Patient education                                             Gait Training                                    Modalities         Heat                                 "

## 2024-05-31 ENCOUNTER — APPOINTMENT (OUTPATIENT)
Dept: PHYSICAL THERAPY | Facility: CLINIC | Age: 49
End: 2024-05-31
Payer: COMMERCIAL

## 2024-06-04 DIAGNOSIS — M25.50 DIFFUSE ARTHRALGIA: ICD-10-CM

## 2024-06-04 RX ORDER — NAPROXEN 500 MG/1
500 TABLET ORAL 2 TIMES DAILY WITH MEALS
Qty: 60 TABLET | Refills: 5 | Status: SHIPPED | OUTPATIENT
Start: 2024-06-04

## 2024-07-05 DIAGNOSIS — J45.40 MODERATE PERSISTENT ASTHMA WITHOUT COMPLICATION: ICD-10-CM

## 2024-07-05 RX ORDER — ALBUTEROL SULFATE 90 UG/1
2 AEROSOL, METERED RESPIRATORY (INHALATION) EVERY 6 HOURS PRN
Qty: 17 G | Refills: 1 | Status: SHIPPED | OUTPATIENT
Start: 2024-07-05

## 2024-07-12 DIAGNOSIS — M79.7 FIBROMYALGIA: ICD-10-CM

## 2024-07-12 DIAGNOSIS — G89.29 CHRONIC NECK PAIN: ICD-10-CM

## 2024-07-12 DIAGNOSIS — M54.2 CHRONIC NECK PAIN: ICD-10-CM

## 2024-07-12 RX ORDER — DULOXETIN HYDROCHLORIDE 60 MG/1
60 CAPSULE, DELAYED RELEASE ORAL DAILY
Qty: 90 CAPSULE | Refills: 1 | Status: SHIPPED | OUTPATIENT
Start: 2024-07-12

## 2024-08-11 DIAGNOSIS — J45.40 MODERATE PERSISTENT ASTHMA WITHOUT COMPLICATION: ICD-10-CM

## 2024-08-11 DIAGNOSIS — F41.1 GENERALIZED ANXIETY DISORDER: ICD-10-CM

## 2024-08-11 DIAGNOSIS — M79.7 FIBROMYALGIA: ICD-10-CM

## 2024-08-11 RX ORDER — MONTELUKAST SODIUM 10 MG/1
10 TABLET ORAL DAILY
Qty: 30 TABLET | Refills: 5 | Status: SHIPPED | OUTPATIENT
Start: 2024-08-11

## 2024-08-11 RX ORDER — DULOXETIN HYDROCHLORIDE 30 MG/1
30 CAPSULE, DELAYED RELEASE ORAL DAILY
Qty: 30 CAPSULE | Refills: 1 | Status: SHIPPED | OUTPATIENT
Start: 2024-08-11

## 2024-09-06 DIAGNOSIS — J45.40 MODERATE PERSISTENT ASTHMA WITHOUT COMPLICATION: ICD-10-CM

## 2024-09-06 RX ORDER — MONTELUKAST SODIUM 10 MG/1
10 TABLET ORAL DAILY
Qty: 90 TABLET | Refills: 1 | Status: SHIPPED | OUTPATIENT
Start: 2024-09-06

## 2024-10-02 DIAGNOSIS — J45.40 MODERATE PERSISTENT ASTHMA WITHOUT COMPLICATION: ICD-10-CM

## 2024-10-02 RX ORDER — FLUTICASONE PROPIONATE AND SALMETEROL 250; 50 UG/1; UG/1
POWDER RESPIRATORY (INHALATION)
Qty: 60 BLISTER | Refills: 4 | Status: SHIPPED | OUTPATIENT
Start: 2024-10-02

## 2024-10-21 DIAGNOSIS — H02.844 PAIN AND SWELLING OF UPPER EYELID OF BOTH EYES: ICD-10-CM

## 2024-10-21 DIAGNOSIS — H02.841 PAIN AND SWELLING OF UPPER EYELID OF BOTH EYES: ICD-10-CM

## 2024-10-21 DIAGNOSIS — H02.89 PAIN AND SWELLING OF UPPER EYELID OF BOTH EYES: ICD-10-CM

## 2024-10-21 DIAGNOSIS — J45.40 MODERATE PERSISTENT ASTHMA WITHOUT COMPLICATION: ICD-10-CM

## 2024-10-22 RX ORDER — ALBUTEROL SULFATE 90 UG/1
2 INHALANT RESPIRATORY (INHALATION) EVERY 6 HOURS PRN
Qty: 17 G | Refills: 0 | Status: SHIPPED | OUTPATIENT
Start: 2024-10-22

## 2024-10-22 RX ORDER — ERYTHROMYCIN 5 MG/G
0.5 OINTMENT OPHTHALMIC EVERY 6 HOURS SCHEDULED
Qty: 3.5 G | Refills: 0 | Status: SHIPPED | OUTPATIENT
Start: 2024-10-22

## 2024-12-13 DIAGNOSIS — J45.40 MODERATE PERSISTENT ASTHMA WITHOUT COMPLICATION: ICD-10-CM

## 2024-12-13 DIAGNOSIS — F41.1 GENERALIZED ANXIETY DISORDER: ICD-10-CM

## 2024-12-13 DIAGNOSIS — M25.50 DIFFUSE ARTHRALGIA: ICD-10-CM

## 2024-12-13 DIAGNOSIS — M79.7 FIBROMYALGIA: ICD-10-CM

## 2024-12-13 DIAGNOSIS — J45.901 ASTHMA EXACERBATION: ICD-10-CM

## 2024-12-13 RX ORDER — FLUTICASONE PROPIONATE AND SALMETEROL 250; 50 UG/1; UG/1
POWDER RESPIRATORY (INHALATION)
Qty: 60 BLISTER | Refills: 0 | Status: SHIPPED | OUTPATIENT
Start: 2024-12-13

## 2024-12-13 RX ORDER — MONTELUKAST SODIUM 10 MG/1
10 TABLET ORAL DAILY
Qty: 90 TABLET | Refills: 0 | Status: SHIPPED | OUTPATIENT
Start: 2024-12-13

## 2024-12-13 RX ORDER — ALBUTEROL SULFATE 90 UG/1
2 INHALANT RESPIRATORY (INHALATION) EVERY 6 HOURS PRN
Qty: 17 G | Refills: 0 | Status: SHIPPED | OUTPATIENT
Start: 2024-12-13

## 2024-12-13 RX ORDER — DULOXETIN HYDROCHLORIDE 30 MG/1
30 CAPSULE, DELAYED RELEASE ORAL DAILY
Qty: 30 CAPSULE | Refills: 0 | Status: SHIPPED | OUTPATIENT
Start: 2024-12-13

## 2024-12-13 RX ORDER — ALBUTEROL SULFATE 0.83 MG/ML
2.5 SOLUTION RESPIRATORY (INHALATION) EVERY 6 HOURS PRN
Qty: 75 ML | Refills: 0 | Status: SHIPPED | OUTPATIENT
Start: 2024-12-13

## 2024-12-13 RX ORDER — NAPROXEN 500 MG/1
500 TABLET ORAL 2 TIMES DAILY WITH MEALS
Qty: 60 TABLET | Refills: 0 | Status: SHIPPED | OUTPATIENT
Start: 2024-12-13

## 2025-03-18 DIAGNOSIS — J45.40 MODERATE PERSISTENT ASTHMA WITHOUT COMPLICATION: ICD-10-CM

## 2025-03-18 DIAGNOSIS — M25.50 DIFFUSE ARTHRALGIA: ICD-10-CM

## 2025-03-18 DIAGNOSIS — M79.7 FIBROMYALGIA: ICD-10-CM

## 2025-03-18 DIAGNOSIS — F41.1 GENERALIZED ANXIETY DISORDER: ICD-10-CM

## 2025-03-19 RX ORDER — FLUTICASONE PROPIONATE AND SALMETEROL 250; 50 UG/1; UG/1
POWDER RESPIRATORY (INHALATION)
Qty: 60 BLISTER | Refills: 0 | Status: SHIPPED | OUTPATIENT
Start: 2025-03-19

## 2025-03-19 RX ORDER — NAPROXEN 500 MG/1
500 TABLET ORAL 2 TIMES DAILY WITH MEALS
Qty: 60 TABLET | Refills: 0 | Status: SHIPPED | OUTPATIENT
Start: 2025-03-19

## 2025-03-19 RX ORDER — MONTELUKAST SODIUM 10 MG/1
10 TABLET ORAL DAILY
Qty: 90 TABLET | Refills: 0 | Status: SHIPPED | OUTPATIENT
Start: 2025-03-19

## 2025-03-19 RX ORDER — ALBUTEROL SULFATE 90 UG/1
2 INHALANT RESPIRATORY (INHALATION) EVERY 6 HOURS PRN
Qty: 17 G | Refills: 0 | Status: SHIPPED | OUTPATIENT
Start: 2025-03-19

## 2025-03-19 RX ORDER — DULOXETIN HYDROCHLORIDE 30 MG/1
30 CAPSULE, DELAYED RELEASE ORAL DAILY
Qty: 30 CAPSULE | Refills: 0 | Status: SHIPPED | OUTPATIENT
Start: 2025-03-19

## 2025-04-25 ENCOUNTER — OFFICE VISIT (OUTPATIENT)
Dept: FAMILY MEDICINE CLINIC | Facility: CLINIC | Age: 50
End: 2025-04-25
Payer: COMMERCIAL

## 2025-04-25 VITALS
HEIGHT: 62 IN | SYSTOLIC BLOOD PRESSURE: 122 MMHG | RESPIRATION RATE: 16 BRPM | DIASTOLIC BLOOD PRESSURE: 80 MMHG | BODY MASS INDEX: 33.13 KG/M2 | HEART RATE: 80 BPM | TEMPERATURE: 97.5 F | WEIGHT: 180 LBS

## 2025-04-25 DIAGNOSIS — Z13.83 SCREENING FOR CARDIOVASCULAR, RESPIRATORY, AND GENITOURINARY DISEASES: ICD-10-CM

## 2025-04-25 DIAGNOSIS — Z13.89 SCREENING FOR CARDIOVASCULAR, RESPIRATORY, AND GENITOURINARY DISEASES: ICD-10-CM

## 2025-04-25 DIAGNOSIS — Z13.29 SCREENING FOR THYROID DISORDER: ICD-10-CM

## 2025-04-25 DIAGNOSIS — R53.83 OTHER FATIGUE: ICD-10-CM

## 2025-04-25 DIAGNOSIS — Z23 ENCOUNTER FOR IMMUNIZATION: ICD-10-CM

## 2025-04-25 DIAGNOSIS — Z00.00 ANNUAL PHYSICAL EXAM: Primary | ICD-10-CM

## 2025-04-25 DIAGNOSIS — Z13.6 SCREENING FOR CARDIOVASCULAR, RESPIRATORY, AND GENITOURINARY DISEASES: ICD-10-CM

## 2025-04-25 DIAGNOSIS — Z12.31 ENCOUNTER FOR SCREENING MAMMOGRAM FOR BREAST CANCER: ICD-10-CM

## 2025-04-25 DIAGNOSIS — Z12.11 SCREENING FOR COLON CANCER: ICD-10-CM

## 2025-04-25 DIAGNOSIS — J45.41 MODERATE PERSISTENT ASTHMA WITH EXACERBATION: ICD-10-CM

## 2025-04-25 DIAGNOSIS — Z13.0 SCREENING FOR DEFICIENCY ANEMIA: ICD-10-CM

## 2025-04-25 PROCEDURE — 90750 HZV VACC RECOMBINANT IM: CPT

## 2025-04-25 PROCEDURE — 90471 IMMUNIZATION ADMIN: CPT

## 2025-04-25 PROCEDURE — 99396 PREV VISIT EST AGE 40-64: CPT | Performed by: FAMILY MEDICINE

## 2025-04-25 RX ORDER — VITAMIN B COMPLEX
1 CAPSULE ORAL DAILY
COMMUNITY

## 2025-04-25 RX ORDER — RIBOFLAVIN (VITAMIN B2) 100 MG
100 TABLET ORAL DAILY
COMMUNITY

## 2025-04-25 RX ORDER — FOLIC ACID/MULTIVIT,IRON,MINER 0.4MG-18MG
TABLET ORAL
COMMUNITY

## 2025-04-25 RX ORDER — GUAIFENESIN/EPHEDRINE HCL 200-12.5MG
TABLET ORAL
COMMUNITY

## 2025-04-25 RX ORDER — DOCUSATE SODIUM 100 MG/1
100 CAPSULE, LIQUID FILLED ORAL 2 TIMES DAILY
COMMUNITY

## 2025-04-25 NOTE — PROGRESS NOTES
Adult Annual Physical  Name: Tracey Vernon      : 1975      MRN: [id number]  Encounter Provider: Bibi Louis MD  Encounter Date: 2025   Encounter department: [facility]    :  Assessment & Plan  Annual physical exam         Moderate persistent asthma with exacerbation  Stable.        Screening for deficiency anemia    Orders:    CBC and differential; Future    Screening for thyroid disorder    Orders:    TSH, 3rd generation with Free T4 reflex; Future    Screening for cardiovascular, respiratory, and genitourinary diseases    Orders:    Comprehensive metabolic panel; Future    Lipid Panel with Direct LDL reflex; Future    Encounter for screening mammogram for breast cancer    Orders:    Mammo screening bilateral w 3d and cad; Future    Screening for colon cancer    Orders:    Cologuard    Encounter for immunization    Orders:    Zoster Vaccine Recombinant IM    Other fatigue    Orders:    Magnesium; Future    Vitamin B12; Future    Vitamin D 25 hydroxy; Future        Preventive Screenings:  - Diabetes Screening: risks/benefits discussed and orders placed  - Cholesterol Screening: risks/benefits discussed and orders placed   - Hepatitis C screening: screening up-to-date   - HIV screening: risks/benefits discussed, patient agrees to screening and orders placed   - Cervical cancer screening: screening up-to-date   - Breast cancer screening: risks/benefits discussed and orders placed   - Colon cancer screening: risks/benefits discussed and orders placed   - Lung cancer screening: screening not indicated     Immunizations:  - Immunizations due: Zoster (Shingrix)         History of Present Illness     Adult Annual Physical:  Patient presents for annual physical.     Diet and Physical Activity:  - Diet/Nutrition: poor diet.  - Exercise: 1-2 hours on average and 5-7 times a week on average.    Depression Screening:  - PHQ-2 Score: 6  - PHQ-9 Score: 12    General Health:  - Sleep: sleeps well.  -  "Hearing: normal hearing right ear and normal hearing left ear.  - Vision: vision problems and most recent eye exam < 1 year ago.  - Dental: no dental visits for > 1 year.    /GYN Health:  - Follows with GYN: no.     Review of Systems   Constitutional: Negative.    HENT: Negative.     Eyes: Negative.    Respiratory: Negative.     Cardiovascular: Negative.    Gastrointestinal: Negative.    Endocrine: Negative.    Genitourinary: Negative.    Musculoskeletal: Negative.    Skin: Negative.    Allergic/Immunologic: Negative.    Neurological: Negative.    Hematological: Negative.    Psychiatric/Behavioral: Negative.           Objective   /80   Pulse 80   Temp 97.5 °F (36.4 °C)   Resp 16   Ht 5' 2\" (1.575 m)   Wt 81.6 kg (180 lb)   BMI 32.92 kg/m²     Physical Exam  Vitals and nursing note reviewed.   Constitutional:       General: She is not in acute distress.     Appearance: She is well-developed.   HENT:      Head: Normocephalic and atraumatic.      Right Ear: Tympanic membrane, ear canal and external ear normal. There is no impacted cerumen.      Left Ear: Tympanic membrane, ear canal and external ear normal. There is no impacted cerumen.      Nose: Nose normal.      Mouth/Throat:      Mouth: Mucous membranes are moist.   Eyes:      Conjunctiva/sclera: Conjunctivae normal.   Cardiovascular:      Rate and Rhythm: Normal rate and regular rhythm.      Heart sounds: No murmur heard.  Pulmonary:      Effort: Pulmonary effort is normal. No respiratory distress.      Breath sounds: Normal breath sounds.   Abdominal:      General: Abdomen is flat. There is no distension.      Palpations: Abdomen is soft. There is no mass.      Tenderness: There is no abdominal tenderness. There is no guarding or rebound.      Hernia: No hernia is present.   Musculoskeletal:         General: Normal range of motion.      Cervical back: Neck supple.   Skin:     General: Skin is warm and dry.   Neurological:      General: No focal " deficit present.      Mental Status: She is alert and oriented to person, place, and time.

## 2025-04-25 NOTE — PATIENT INSTRUCTIONS
"Patient Education     Routine physical for adults   The Basics   Written by the doctors and editors at [facility]   What is a physical? -- A physical is a routine visit, or \"check-up,\" with your doctor. You might also hear it called a \"wellness visit\" or \"preventive visit.\"  During each visit, the doctor will:   Ask about your physical and mental health   Ask about your habits, behaviors, and lifestyle   Do an exam   Give you vaccines if needed   Talk to you about any medicines you take   Give advice about your health   Answer your questions  Getting regular check-ups is an important part of taking care of your health. It can help your doctor find and treat any problems you have. But it's also important for preventing health problems.  A routine physical is different from a \"sick visit.\" A sick visit is when you see a doctor because of a health concern or problem. Since physicals are scheduled ahead of time, you can think about what you want to ask the doctor.  How often should I get a physical? -- It depends on your age and health. In general, for people age 21 years and older:   If you are younger than 50 years, you might be able to get a physical every 3 years.   If you are 50 years or older, your doctor might recommend a physical every year.  If you have an ongoing health condition, like diabetes or high blood pressure, your doctor will probably want to see you more often.  What happens during a physical? -- In general, each visit will include:   Physical exam - The doctor or nurse will check your height, weight, heart rate, and blood pressure. They will also look at your eyes and ears. They will ask about how you are feeling and whether you have any symptoms that bother you.   Medicines - It's a good idea to bring a list of all the medicines you take to each doctor visit. Your doctor will talk to you about your medicines and answer any questions. Tell them if you are having any side effects that bother you. You " "should also tell them if you are having trouble paying for any of your medicines.   Habits and behaviors - This includes:   Your diet   Your exercise habits   Whether you smoke, drink alcohol, or use drugs   Whether you are sexually active   Whether you feel safe at home  Your doctor will talk to you about things you can do to improve your health and lower your risk of health problems. They will also offer help and support. For example, if you want to quit smoking, they can give you advice and might prescribe medicines. If you want to improve your diet or get more physical activity, they can help you with this, too.   Lab tests, if needed - The tests you get will depend on your age and situation. For example, your doctor might want to check your:   Cholesterol   Blood sugar   Iron level   Vaccines - The recommended vaccines will depend on your age, health, and what vaccines you already had. Vaccines are very important because they can prevent certain serious or deadly infections.   Discussion of screening - \"Screening\" means checking for diseases or other health problems before they cause symptoms. Your doctor can recommend screening based on your age, risk, and preferences. This might include tests to check for:   Cancer, such as breast, prostate, cervical, ovarian, colorectal, prostate, lung, or skin cancer   Sexually transmitted infections, such as chlamydia and gonorrhea   Mental health conditions like depression and anxiety  Your doctor will talk to you about the different types of screening tests. They can help you decide which screenings to have. They can also explain what the results might mean.   Answering questions - The physical is a good time to ask the doctor or nurse questions about your health. If needed, they can refer you to other doctors or specialists, too.  Adults older than 65 years often need other care, too. As you get older, your doctor will talk to you about:   How to prevent falling at " home   Hearing or vision tests   Memory testing   How to take your medicines safely   Making sure that you have the help and support you need at home  All topics are updated as new evidence becomes available and our peer review process is complete.  This topic retrieved from [employer] on: May 02, 2024.  Topic 196768 Version 1.0  Release: 32.4.3 - C32.122  © 2024 [employer], [employer] and/or its affiliates. All rights reserved.  Consumer Information Use and Disclaimer   Disclaimer: This generalized information is a limited summary of diagnosis, treatment, and/or medication information. It is not meant to be comprehensive and should be used as a tool to help the user understand and/or assess potential diagnostic and treatment options. It does NOT include all information about conditions, treatments, medications, side effects, or risks that may apply to a specific patient. It is not intended to be medical advice or a substitute for the medical advice, diagnosis, or treatment of a health care provider based on the health care provider's examination and assessment of a patient's specific and unique circumstances. Patients must speak with a health care provider for complete information about their health, medical questions, and treatment options, including any risks or benefits regarding use of medications. This information does not endorse any treatments or medications as safe, effective, or approved for treating a specific patient. Up[employer], Inc[employer] and its affiliates disclaim any warranty or liability relating to this information or the use thereof.The use of this information is governed by the Terms of Use, available at https://www.woltersVisiogenuwer.com/en/know/clinical-effectiveness-terms. 2024© [employer]. and its affiliates and/or licensors. All rights reserved.  Copyright   © 2024 [employer] and/or its affiliates. All rights reserved.

## 2025-05-02 ENCOUNTER — APPOINTMENT (OUTPATIENT)
Dept: LAB | Facility: HOSPITAL | Age: 50
End: 2025-05-02
Attending: FAMILY MEDICINE
Payer: COMMERCIAL

## 2025-05-02 DIAGNOSIS — Z13.0 SCREENING FOR DEFICIENCY ANEMIA: ICD-10-CM

## 2025-05-02 DIAGNOSIS — R53.83 OTHER FATIGUE: ICD-10-CM

## 2025-05-02 DIAGNOSIS — E55.9 AVITAMINOSIS D: ICD-10-CM

## 2025-05-02 DIAGNOSIS — Z13.6 SCREENING FOR CARDIOVASCULAR, RESPIRATORY, AND GENITOURINARY DISEASES: ICD-10-CM

## 2025-05-02 DIAGNOSIS — Z13.83 SCREENING FOR CARDIOVASCULAR, RESPIRATORY, AND GENITOURINARY DISEASES: ICD-10-CM

## 2025-05-02 DIAGNOSIS — Z13.29 SCREENING FOR THYROID DISORDER: ICD-10-CM

## 2025-05-02 DIAGNOSIS — M79.7 SCAPULOHUMERAL FIBROSITIS: ICD-10-CM

## 2025-05-02 DIAGNOSIS — Z13.89 SCREENING FOR CARDIOVASCULAR, RESPIRATORY, AND GENITOURINARY DISEASES: ICD-10-CM

## 2025-05-02 LAB
25(OH)D3 SERPL-MCNC: 90.1 NG/ML (ref 30–100)
ALBUMIN SERPL BCG-MCNC: 4.1 G/DL (ref 3.5–5)
ALP SERPL-CCNC: 62 U/L (ref 34–104)
ALT SERPL W P-5'-P-CCNC: 14 U/L (ref 7–52)
ANION GAP SERPL CALCULATED.3IONS-SCNC: 8 MMOL/L (ref 4–13)
AST SERPL W P-5'-P-CCNC: 16 U/L (ref 13–39)
BASOPHILS # BLD AUTO: 0.06 THOUSANDS/ÂΜL (ref 0–0.1)
BASOPHILS NFR BLD AUTO: 1 % (ref 0–1)
BILIRUB SERPL-MCNC: 0.35 MG/DL (ref 0.2–1)
BUN SERPL-MCNC: 13 MG/DL (ref 5–25)
CALCIUM SERPL-MCNC: 9.2 MG/DL (ref 8.4–10.2)
CHLORIDE SERPL-SCNC: 105 MMOL/L (ref 96–108)
CHOLEST SERPL-MCNC: 193 MG/DL (ref ?–200)
CO2 SERPL-SCNC: 24 MMOL/L (ref 21–32)
CREAT SERPL-MCNC: 0.86 MG/DL (ref 0.6–1.3)
EOSINOPHIL # BLD AUTO: 0.14 THOUSAND/ÂΜL (ref 0–0.61)
EOSINOPHIL NFR BLD AUTO: 2 % (ref 0–6)
ERYTHROCYTE [DISTWIDTH] IN BLOOD BY AUTOMATED COUNT: 13.6 % (ref 11.6–15.1)
GFR SERPL CREATININE-BSD FRML MDRD: 79 ML/MIN/1.73SQ M
GLUCOSE P FAST SERPL-MCNC: 85 MG/DL (ref 65–99)
HCT VFR BLD AUTO: 41.6 % (ref 34.8–46.1)
HDLC SERPL-MCNC: 58 MG/DL
HGB BLD-MCNC: 14.1 G/DL (ref 11.5–15.4)
IMM GRANULOCYTES # BLD AUTO: 0.01 THOUSAND/UL (ref 0–0.2)
IMM GRANULOCYTES NFR BLD AUTO: 0 % (ref 0–2)
LDLC SERPL CALC-MCNC: 123 MG/DL (ref 0–100)
LYMPHOCYTES # BLD AUTO: 2.16 THOUSANDS/ÂΜL (ref 0.6–4.47)
LYMPHOCYTES NFR BLD AUTO: 34 % (ref 14–44)
MAGNESIUM SERPL-MCNC: 1.8 MG/DL (ref 1.9–2.7)
MCH RBC QN AUTO: 31.1 PG (ref 26.8–34.3)
MCHC RBC AUTO-ENTMCNC: 33.9 G/DL (ref 31.4–37.4)
MCV RBC AUTO: 92 FL (ref 82–98)
MONOCYTES # BLD AUTO: 0.48 THOUSAND/ÂΜL (ref 0.17–1.22)
MONOCYTES NFR BLD AUTO: 8 % (ref 4–12)
NEUTROPHILS # BLD AUTO: 3.49 THOUSANDS/ÂΜL (ref 1.85–7.62)
NEUTS SEG NFR BLD AUTO: 55 % (ref 43–75)
NRBC BLD AUTO-RTO: 0 /100 WBCS
PLATELET # BLD AUTO: 272 THOUSANDS/UL (ref 149–390)
PMV BLD AUTO: 11.2 FL (ref 8.9–12.7)
POTASSIUM SERPL-SCNC: 4.1 MMOL/L (ref 3.5–5.3)
PROT SERPL-MCNC: 6.8 G/DL (ref 6.4–8.4)
RBC # BLD AUTO: 4.53 MILLION/UL (ref 3.81–5.12)
SODIUM SERPL-SCNC: 137 MMOL/L (ref 135–147)
TRIGL SERPL-MCNC: 59 MG/DL (ref ?–150)
TSH SERPL DL<=0.05 MIU/L-ACNC: 3.73 UIU/ML (ref 0.45–4.5)
VIT B12 SERPL-MCNC: 657 PG/ML (ref 180–914)
WBC # BLD AUTO: 6.34 THOUSAND/UL (ref 4.31–10.16)

## 2025-05-02 PROCEDURE — 36415 COLL VENOUS BLD VENIPUNCTURE: CPT

## 2025-05-02 PROCEDURE — 80061 LIPID PANEL: CPT

## 2025-05-02 PROCEDURE — 82306 VITAMIN D 25 HYDROXY: CPT

## 2025-05-02 PROCEDURE — 83735 ASSAY OF MAGNESIUM: CPT

## 2025-05-02 PROCEDURE — 84443 ASSAY THYROID STIM HORMONE: CPT

## 2025-05-02 PROCEDURE — 82607 VITAMIN B-12: CPT

## 2025-05-02 PROCEDURE — 80053 COMPREHEN METABOLIC PANEL: CPT

## 2025-05-02 PROCEDURE — 85025 COMPLETE CBC W/AUTO DIFF WBC: CPT

## 2025-05-05 ENCOUNTER — RESULTS FOLLOW-UP (OUTPATIENT)
Dept: FAMILY MEDICINE CLINIC | Facility: CLINIC | Age: 50
End: 2025-05-05

## 2025-05-14 DIAGNOSIS — F41.1 GENERALIZED ANXIETY DISORDER: ICD-10-CM

## 2025-05-14 DIAGNOSIS — M25.50 DIFFUSE ARTHRALGIA: ICD-10-CM

## 2025-05-14 DIAGNOSIS — M79.7 FIBROMYALGIA: ICD-10-CM

## 2025-05-14 DIAGNOSIS — J45.40 MODERATE PERSISTENT ASTHMA WITHOUT COMPLICATION: ICD-10-CM

## 2025-05-15 DIAGNOSIS — J45.40 MODERATE PERSISTENT ASTHMA WITHOUT COMPLICATION: ICD-10-CM

## 2025-05-15 RX ORDER — NAPROXEN 500 MG/1
500 TABLET ORAL 2 TIMES DAILY WITH MEALS
Qty: 180 TABLET | Refills: 0 | Status: SHIPPED | OUTPATIENT
Start: 2025-05-15

## 2025-05-15 RX ORDER — FLUTICASONE PROPIONATE AND SALMETEROL 250; 50 UG/1; UG/1
POWDER RESPIRATORY (INHALATION)
Qty: 180 BLISTER | Refills: 1 | Status: SHIPPED | OUTPATIENT
Start: 2025-05-15

## 2025-05-15 RX ORDER — DULOXETIN HYDROCHLORIDE 30 MG/1
30 CAPSULE, DELAYED RELEASE ORAL DAILY
Qty: 90 CAPSULE | Refills: 1 | Status: SHIPPED | OUTPATIENT
Start: 2025-05-15

## 2025-05-16 RX ORDER — ALBUTEROL SULFATE 90 UG/1
2 INHALANT RESPIRATORY (INHALATION) EVERY 6 HOURS PRN
Qty: 18 G | Refills: 1 | Status: SHIPPED | OUTPATIENT
Start: 2025-05-16

## 2025-06-07 DIAGNOSIS — J45.40 MODERATE PERSISTENT ASTHMA WITHOUT COMPLICATION: ICD-10-CM

## 2025-06-07 DIAGNOSIS — M25.50 DIFFUSE ARTHRALGIA: ICD-10-CM

## 2025-06-07 DIAGNOSIS — K59.00 CONSTIPATION, UNSPECIFIED CONSTIPATION TYPE: Primary | ICD-10-CM

## 2025-06-07 DIAGNOSIS — J45.901 ASTHMA EXACERBATION: ICD-10-CM

## 2025-06-07 DIAGNOSIS — F41.8 SITUATIONAL ANXIETY: ICD-10-CM

## 2025-06-07 DIAGNOSIS — F41.1 GENERALIZED ANXIETY DISORDER: ICD-10-CM

## 2025-06-07 DIAGNOSIS — M79.7 FIBROMYALGIA: ICD-10-CM

## 2025-06-08 DIAGNOSIS — J45.40 MODERATE PERSISTENT ASTHMA WITHOUT COMPLICATION: ICD-10-CM

## 2025-06-08 DIAGNOSIS — F41.8 SITUATIONAL ANXIETY: ICD-10-CM

## 2025-06-08 DIAGNOSIS — M79.7 FIBROMYALGIA: ICD-10-CM

## 2025-06-08 DIAGNOSIS — M25.50 DIFFUSE ARTHRALGIA: ICD-10-CM

## 2025-06-08 DIAGNOSIS — F41.1 GENERALIZED ANXIETY DISORDER: ICD-10-CM

## 2025-06-08 RX ORDER — NAPROXEN 500 MG/1
500 TABLET ORAL 2 TIMES DAILY WITH MEALS
Qty: 180 TABLET | Refills: 0 | Status: SHIPPED | OUTPATIENT
Start: 2025-06-08 | End: 2025-06-08 | Stop reason: SDUPTHER

## 2025-06-08 RX ORDER — HYDROXYZINE HYDROCHLORIDE 25 MG/1
25 TABLET, FILM COATED ORAL EVERY 6 HOURS PRN
Qty: 30 TABLET | Refills: 5 | Status: SHIPPED | OUTPATIENT
Start: 2025-06-08 | End: 2025-06-08 | Stop reason: SDUPTHER

## 2025-06-08 RX ORDER — DULOXETIN HYDROCHLORIDE 30 MG/1
30 CAPSULE, DELAYED RELEASE ORAL DAILY
Qty: 90 CAPSULE | Refills: 0 | Status: SHIPPED | OUTPATIENT
Start: 2025-06-08 | End: 2025-06-08 | Stop reason: SDUPTHER

## 2025-06-08 RX ORDER — ALBUTEROL SULFATE 0.83 MG/ML
1 SOLUTION RESPIRATORY (INHALATION) EVERY 6 HOURS PRN
Qty: 75 ML | Refills: 1 | Status: SHIPPED | OUTPATIENT
Start: 2025-06-08

## 2025-06-08 RX ORDER — FLUTICASONE PROPIONATE AND SALMETEROL 250; 50 UG/1; UG/1
POWDER RESPIRATORY (INHALATION)
Qty: 180 BLISTER | Refills: 0 | Status: SHIPPED | OUTPATIENT
Start: 2025-06-08 | End: 2025-06-08 | Stop reason: SDUPTHER

## 2025-06-08 RX ORDER — MONTELUKAST SODIUM 10 MG/1
10 TABLET ORAL DAILY
Qty: 90 TABLET | Refills: 1 | Status: SHIPPED | OUTPATIENT
Start: 2025-06-08 | End: 2025-06-08 | Stop reason: SDUPTHER

## 2025-06-09 RX ORDER — DULOXETIN HYDROCHLORIDE 30 MG/1
30 CAPSULE, DELAYED RELEASE ORAL DAILY
Qty: 90 CAPSULE | Refills: 0 | Status: SHIPPED | OUTPATIENT
Start: 2025-06-09

## 2025-06-09 RX ORDER — NAPROXEN 500 MG/1
500 TABLET ORAL 2 TIMES DAILY WITH MEALS
Qty: 180 TABLET | Refills: 0 | Status: SHIPPED | OUTPATIENT
Start: 2025-06-09

## 2025-06-09 RX ORDER — ALBUTEROL SULFATE 90 UG/1
2 INHALANT RESPIRATORY (INHALATION) EVERY 6 HOURS PRN
Qty: 18 G | Refills: 1 | Status: SHIPPED | OUTPATIENT
Start: 2025-06-09

## 2025-06-09 RX ORDER — HYDROXYZINE HYDROCHLORIDE 25 MG/1
25 TABLET, FILM COATED ORAL EVERY 6 HOURS PRN
Qty: 30 TABLET | Refills: 5 | Status: SHIPPED | OUTPATIENT
Start: 2025-06-09

## 2025-06-09 RX ORDER — DOCUSATE SODIUM 100 MG/1
100 CAPSULE, LIQUID FILLED ORAL 2 TIMES DAILY
Qty: 90 CAPSULE | Refills: 1 | Status: SHIPPED | OUTPATIENT
Start: 2025-06-09

## 2025-06-09 RX ORDER — MONTELUKAST SODIUM 10 MG/1
10 TABLET ORAL DAILY
Qty: 90 TABLET | Refills: 1 | Status: SHIPPED | OUTPATIENT
Start: 2025-06-09

## 2025-06-09 RX ORDER — FLUTICASONE PROPIONATE AND SALMETEROL 250; 50 UG/1; UG/1
POWDER RESPIRATORY (INHALATION)
Qty: 180 BLISTER | Refills: 0 | Status: SHIPPED | OUTPATIENT
Start: 2025-06-09

## 2025-06-17 DIAGNOSIS — Z00.6 ENCOUNTER FOR EXAMINATION FOR NORMAL COMPARISON OR CONTROL IN CLINICAL RESEARCH PROGRAM: ICD-10-CM

## 2025-06-17 DIAGNOSIS — H02.844 PAIN AND SWELLING OF UPPER EYELID OF BOTH EYES: ICD-10-CM

## 2025-06-17 DIAGNOSIS — H02.841 PAIN AND SWELLING OF UPPER EYELID OF BOTH EYES: ICD-10-CM

## 2025-06-17 DIAGNOSIS — H02.89 PAIN AND SWELLING OF UPPER EYELID OF BOTH EYES: ICD-10-CM

## 2025-06-17 RX ORDER — PREDNISONE 10 MG/1
TABLET ORAL
Qty: 30 TABLET | Refills: 0 | Status: SHIPPED | OUTPATIENT
Start: 2025-06-17 | End: 2025-06-29

## 2025-06-17 RX ORDER — ERYTHROMYCIN 5 MG/G
0.5 OINTMENT OPHTHALMIC EVERY 6 HOURS SCHEDULED
Qty: 3.5 G | Refills: 0 | Status: SHIPPED | OUTPATIENT
Start: 2025-06-17

## 2025-06-17 RX ORDER — SULFAMETHOXAZOLE AND TRIMETHOPRIM 800; 160 MG/1; MG/1
1 TABLET ORAL 2 TIMES DAILY
Qty: 14 TABLET | Refills: 0 | Status: SHIPPED | OUTPATIENT
Start: 2025-06-17 | End: 2025-06-24

## 2025-07-30 ENCOUNTER — PATIENT MESSAGE (OUTPATIENT)
Dept: FAMILY MEDICINE CLINIC | Facility: CLINIC | Age: 50
End: 2025-07-30

## 2025-07-31 ENCOUNTER — NURSE TRIAGE (OUTPATIENT)
Age: 50
End: 2025-07-31

## 2025-08-05 ENCOUNTER — TELEPHONE (OUTPATIENT)
Dept: RADIOLOGY | Facility: HOSPITAL | Age: 50
End: 2025-08-05